# Patient Record
Sex: FEMALE | Race: OTHER | Employment: FULL TIME | RURAL
[De-identification: names, ages, dates, MRNs, and addresses within clinical notes are randomized per-mention and may not be internally consistent; named-entity substitution may affect disease eponyms.]

---

## 2017-01-04 ENCOUNTER — TELEPHONE (OUTPATIENT)
Dept: FAMILY MEDICINE CLINIC | Age: 27
End: 2017-01-04

## 2017-01-04 NOTE — TELEPHONE ENCOUNTER
Left message with patient's  to have patient call. She needs to schedule her postpartum appointment with Dr. Alla Ibanez around 1/30/17.

## 2017-01-31 ENCOUNTER — OFFICE VISIT (OUTPATIENT)
Dept: FAMILY MEDICINE CLINIC | Age: 27
End: 2017-01-31

## 2017-01-31 VITALS
TEMPERATURE: 98.2 F | RESPIRATION RATE: 16 BRPM | SYSTOLIC BLOOD PRESSURE: 97 MMHG | DIASTOLIC BLOOD PRESSURE: 70 MMHG | OXYGEN SATURATION: 97 % | BODY MASS INDEX: 30 KG/M2 | WEIGHT: 163 LBS | HEART RATE: 72 BPM | HEIGHT: 62 IN

## 2017-01-31 RX ORDER — NORGESTIMATE AND ETHINYL ESTRADIOL 0.25-0.035
1 KIT ORAL DAILY
Qty: 1 DOSE PACK | Refills: 12 | Status: SHIPPED | OUTPATIENT
Start: 2017-01-31 | End: 2017-02-28

## 2017-01-31 NOTE — PATIENT INSTRUCTIONS
After Your Delivery (the Postpartum Period): Care Instructions  Your Care Instructions  Congratulations on the birth of your baby. Like pregnancy, the  period can be a time of excitement, callie, and exhaustion. You may look at your wondrous little baby and feel happy. You may also be overwhelmed by your new sleep hours and new responsibilities. At first, babies often sleep during the days and are awake at night. They do not have a pattern or routine. They may make sudden gasps, jerk themselves awake, or look like they have crossed eyes. These are all normal, and they may even make you smile. In these first weeks after delivery, try to take good care of yourself. It may take 4 to 6 weeks to feel like yourself again, and possibly longer if you had a  birth. You will likely feel very tired for several weeks. Your days will be full of ups and downs, but lots of callie as well. Follow-up care is a key part of your treatment and safety. Be sure to make and go to all appointments, and call your doctor if you are having problems. It's also a good idea to know your test results and keep a list of the medicines you take. How can you care for yourself at home? Take care of your body after delivery  · Use pads instead of tampons for the bloody flow that may last as long as 2 weeks. · Ease cramps with ibuprofen (Advil, Motrin). · Ease soreness of hemorrhoids and the area between your vagina and rectum with ice compresses or witch hazel pads. · Ease constipation by drinking lots of fluid and eating high-fiber foods. Ask your doctor about over-the-counter stool softeners. · Cleanse yourself with a gentle squeeze of warm water from a bottle instead of wiping with toilet paper. · Take a sitz bath in warm water several times a day. · Wear a good nursing bra. Ease sore and swollen breasts with warm, wet washcloths. · If you are not breastfeeding, use ice rather than heat for breast soreness.   · Your period may not start for several months if you are breastfeeding. You may bleed more, and longer at first, than you did before you got pregnant. · Wait until you are healed (about 4 to 6 weeks) before you have sexual intercourse. Your doctor will tell you when it is okay to have sex. · Try not to travel with your baby for 5 or 6 weeks. If you take a long car trip, make frequent stops to walk around and stretch. Avoid exhaustion  · Rest every day. Try to nap when your baby naps. · Ask another adult to be with you for a few days after delivery. · Plan for  if you have other children. · Stay flexible so you can eat at odd hours and sleep when you need to. Both you and your baby are making new schedules. · Plan small trips to get out of the house. Change can make you feel less tired. · Ask for help with housework, cooking, and shopping. Remind yourself that your job is to care for your baby. Know about help for postpartum depression  · \"Baby blues\" are common for the first 1 to 2 weeks after birth. You may cry or feel sad or irritable for no reason. · Rest whenever you can. Being tired makes it harder to handle your emotions. · Go for walks with your baby. · Talk to your partner, friends, and family about your feelings. · If your symptoms last for more than a few weeks, or if you feel very depressed, ask your doctor for help. · Postpartum depression can be treated. Support groups and counseling can help. Sometimes medicine can also help. Stay healthy  · Eat healthy foods so you have more energy, make good breast milk, and lose extra baby pounds. · If you breastfeed, avoid alcohol and drugs. Stay smoke-free. If you quit during pregnancy, congratulations. · Start daily exercise after 4 to 6 weeks, but rest when you feel tired. · Learn exercises to tone your belly. Do Kegel exercises to regain strength in your pelvic muscles. You can do these exercises while you stand or sit.   ¨ Squeeze the same muscles you would use to stop your urine. Your belly and thighs should not move. ¨ Hold the squeeze for 3 seconds, and then relax for 3 seconds. ¨ Start with 3 seconds. Then add 1 second each week until you are able to squeeze for 10 seconds. ¨ Repeat the exercise 10 to 15 times for each session. Do three or more sessions each day. · Find a class for new mothers and new babies that has an exercise time. · If you had a  birth, give yourself a bit more time before you exercise, and be careful. When should you call for help? Call 911 anytime you think you may need emergency care. For example, call if:  · You passed out (lost consciousness). Call your doctor now or seek immediate medical care if:  · You have severe vaginal bleeding. This means you are passing blood clots and soaking through a pad each hour for 2 or more hours. · You are dizzy or lightheaded, or you feel like you may faint. · You have a fever. · You have new belly pain, or your pain gets worse. Watch closely for changes in your health, and be sure to contact your doctor if:  · Your vaginal bleeding seems to be getting heavier. · You have new or worse vaginal discharge. · You feel sad, anxious, or hopeless for more than a few days. · You do not get better as expected. Where can you learn more? Go to http://maciej-demarco.info/. Enter A461 in the search box to learn more about \"After Your Delivery (the Postpartum Period): Care Instructions. \"  Current as of: May 30, 2016  Content Version: 11.1  © 6930-0404 [employer]. Care instructions adapted under license by [employer] (which disclaims liability or warranty for this information). If you have questions about a medical condition or this instruction, always ask your healthcare professional. [address] any warranty or liability for your use of this information.

## 2017-01-31 NOTE — PROGRESS NOTES
Reviewed record in preparation for visit and have obtained necessary documentation. Patient did not bring medications to visit for review. Information provided on Advanced Directive, Living Will.   Health Maintenance Due   Topic Date Due    HPV AGE 9Y-34Y (1 of 3 - Female 3 Dose Series) 09/08/2001

## 2017-01-31 NOTE — PROGRESS NOTES
Prabha Montalvo is an 32 y.o. female who presents with chief concern of post partum 6 week. She has recently gone back to work and baby is staying with his grandmother. Ms Andrey Downing is doing very well and enjoying baby. She is concerned with 15# of weight above pre-pregnancy weight. Baby: Doing well, baby is now having both formula and breast.  She is breast feeding at night and formula during the day. Grand-mother is watching during the day. Breast: No inflammation or pain. She is not pumping at this time. Birth control: She previously was on OCP, and is interested in resuming. Bleeding: Light period which is almost stopped. Blues: Her mood is great, she has no thoughts of hurting self or baby. She had depression with her previous pregnancy, but none this time. Detailed ROS below. Review of Systems, positives are bolded, strike through if denied. GEN:    CV:      Pulm:    Abd/GI:   Psych:    Neuro:     ENT:      Endocrine:     :      MSK:      Skin:    Lower Ext:           Current and past medical information:  I personally reviewed and included updated list below. Past Medical History   Diagnosis Date    Anemia 7/30/2012    Depression 4/19/2013       No Known Allergies    History reviewed. No pertinent past surgical history.     Social History     Social History    Marital status:      Spouse name: N/A    Number of children: N/A    Years of education: N/A     Social History Main Topics    Smoking status: Never Smoker    Smokeless tobacco: Never Used    Alcohol use No    Drug use: No    Sexual activity: Yes     Partners: Male     Birth control/ protection: None     Other Topics Concern    None     Social History Narrative           Physical Exam, Abnormal/pertinent findings bolded,     Visit Vitals    BP 97/70 (BP 1 Location: Left arm, BP Patient Position: Sitting)    Pulse 72    Temp 98.2 °F (36.8 °C) (Oral)    Resp 16    Ht 5' 2\" (1.575 m)    Wt 163 lb (73.9 kg)    SpO2 97%    BMI 29.81 kg/m2          GEN:    Alert and Oriented, No acute distress  Psych:  Mood appropriate, No pressured speech, linear thoughts  CV:    Regular Rhythm, S1 and S2 audible, no MRG, no palpable thrills  Pulm:    CTA B/L, no wheezes/rubs  GI/Abd:   Non-tender to palpation, normal bowel sounds x4, no masses  Neuro:   Lucid, No focal deficits  ENT:    EOMI, Non-icteric sclera, MMM  Neck:   Trachea midline, no lymphadenopathy  :    No suprapubic tenderness  MSK:  Normal gait, FROM in all four extremities  Skin:   No visible Rash, Ecchymosis, or Excoriations  Lower Ext: No edema, No tenderness to palpation, No palpable cords  GYN:  Uterus not enlarged, no adnexal masses, cervix closed. Assessment/PLAN    1. Postpartum care and examination  - She is doing great post-partum. Plan to resume her OCP's. Discussed normal lochia and likely resolution soon. Discussed safety of baby and signs of depression and ways to cope if occurs. - norgestimate-ethinyl estradiol (Meredith Cambria) 0.25-35 mg-mcg tab; Take 1 Tab by mouth daily for 28 days. Indications: ABNORMAL UTERINE BLEEDING  Dispense: 1 Dose Pack; Refill: 12    Follow-up Disposition: Not on File  For next visit follow up safe weight loss. Plan of care:  Discussed diagnoses in detail with patient. Medication risks/benefits/side effects discussed with patient. All of the patient's questions were addressed. The patient understands and agrees with our plan of care. The patient knows to call back if they are unsure of or forget any changes we discussed today or if the symptoms change. The patient received an After-Visit Summary which contains VS, orders, medication list and allergy list. This can be used as a \"mini-medical record\" should they have to seek medical care while out of town.       Bo Aguilera DO  Resident note, PGY-3  Patient discussed with Precept Physician, Preston Hare MD    No future appointments. Current Medications after this visit[de-identified]       Current Outpatient Prescriptions   Medication Sig    norgestimate-ethinyl estradiol (ORTHO-CYCLEN, SPRINTEC) 0.25-35 mg-mcg tab Take 1 Tab by mouth daily for 28 days. Indications: ABNORMAL UTERINE BLEEDING     No current facility-administered medications for this visit.

## 2017-01-31 NOTE — MR AVS SNAPSHOT
Visit Information Cyn Hoff Personal Médico Departamento Teléfono del Dep. Número de visita 1/31/2017  3:30  [facility] S [phone] Upcoming Health Maintenance Date Due  
 HPV AGE 9Y-34Y (1 of 3 - Female 3 Dose Series) 9/8/2001 PAP AKA CERVICAL CYTOLOGY 5/17/2019 DTaP/Tdap/Td series (2 - Td) 10/31/2022 [place]  Review Complete El: 1/31/2017 Por: Dennora Maldonado LPN A partir del:  1/31/2017 No Known Allergies Vacunas actuales BCXTUCKNA el:  11/10/2016 Joy Duel TDAP Vaccine 10/31/2012 12:16 PM  
  
 No revisadas esta visita You Were Diagnosed With   
  
 Monisha Pu Postpartum care and examination    -  Primary ICD-10-CM: Z39.2 ICD-9-CM: V24.2 Partes vitales PS Pulso Temperatura Resp [place] ( percentil de crecimiento) Peso (percentil de crecimiento) 97/70 (BP 1 Location: Left arm, BP Patient Position: Sitting) 72 98.2 °F (36.8 °C) (Oral) 16 5' 2\" (1.575 m) 163 lb (73.9 kg) SpO2 BMI (C) Estado obstétrico Estatus de tabaquísmo 97% 29.81 kg/m2 Recent pregnancy Never Smoker Historial de signos vitales BMI and BSA Data Body Mass Index Body Surface Area  
 29.81 kg/m 2 1.8 m 2 Efrem Mcdaniels Pharmacy Name Phone [facility] [address] [place] [address] [phone] Tyler lista de medicamentos actualizada Lista actualizada el: 1/31/17  4:11 PM.  Gino Gallego use tyler lista de medicamentos más reciente. ferrous sulfate 325 mg (65 mg iron) tablet Take 1 Tab by mouth Daily (before breakfast). Indications: IRON DEFICIENCY ANEMIA  
  
 ibuprofen 800 mg tablet También conocido luis:  MOTRIN Take 1 Tab by mouth every eight (8) hours as needed for Pain.  
  
 norgestimate-ethinyl estradiol 0.25-35 mg-mcg Tab También conocido luis:  Herlinda Cook Take 1 Tab by mouth daily for 28 days. Indications: ABNORMAL UTERINE BLEEDING  
  
 prenatal multivit-ca-min-fe-fa Tab También conocido luis:  PRENATAL VITAMIN Take 1 Tab by mouth daily. Recetas Hortenciado ayaz Walton Refills  
 norgestimate-ethinyl estradiol (ORTHO-CYCLEN, SPRINTEC) 0.25-35 mg-mcg tab 12 Sig: Take 1 Tab by mouth daily for 28 days. Indications: ABNORMAL UTERINE BLEEDING Class: Normal  
 Pharmacy: [address] #: [phone] Route: Oral  
  
Instrucciones para el Paciente After Your Delivery (the Postpartum Period): Care Instructions Your Care Instructions Congratulations on the birth of your baby. Like pregnancy, the  period can be a time of excitement, callie, and exhaustion. You may look at your wondrous little baby and feel happy. You may also be overwhelmed by your new sleep hours and new responsibilities. At first, babies often sleep during the days and are awake at night. They do not have a pattern or routine. They may make sudden gasps, jerk themselves awake, or look like they have crossed eyes. These are all normal, and they may even make you smile. In these first weeks after delivery, try to take good care of yourself. It may take 4 to 6 weeks to feel like yourself again, and possibly longer if you had a  birth. You will likely feel very tired for several weeks. Your days will be full of ups and downs, but lots of callie as well. Follow-up care is a key part of your treatment and safety. Be sure to make and go to all appointments, and call your doctor if you are having problems. It's also a good idea to know your test results and keep a list of the medicines you take. How can you care for yourself at home? Take care of your body after delivery · Use pads instead of tampons for the bloody flow that may last as long as 2 weeks. · Ease cramps with ibuprofen (Advil, Motrin). · Ease soreness of hemorrhoids and the area between your vagina and rectum with ice compresses or witch hazel pads. · Ease constipation by drinking lots of fluid and eating high-fiber foods. Ask your doctor about over-the-counter stool softeners. · Cleanse yourself with a gentle squeeze of warm water from a bottle instead of wiping with toilet paper. · Take a sitz bath in warm water several times a day. · Wear a good nursing bra. Ease sore and swollen breasts with warm, wet washcloths. · If you are not breastfeeding, use ice rather than heat for breast soreness. · Your period may not start for several months if you are breastfeeding. You may bleed more, and longer at first, than you did before you got pregnant. · Wait until you are healed (about 4 to 6 weeks) before you have sexual intercourse. Your doctor will tell you when it is okay to have sex. · Try not to travel with your baby for 5 or 6 weeks. If you take a long car trip, make frequent stops to walk around and stretch. Avoid exhaustion · Rest every day. Try to nap when your baby naps. · Ask another adult to be with you for a few days after delivery. · Plan for  if you have other children. · Stay flexible so you can eat at odd hours and sleep when you need to. Both you and your baby are making new schedules. · Plan small trips to get out of the house. Change can make you feel less tired. · Ask for help with housework, cooking, and shopping. Remind yourself that your job is to care for your baby. Know about help for postpartum depression · \"Baby blues\" are common for the first 1 to 2 weeks after birth. You may cry or feel sad or irritable for no reason. · Rest whenever you can. Being tired makes it harder to handle your emotions. · Go for walks with your baby. · Talk to your partner, friends, and family about your feelings. · If your symptoms last for more than a few weeks, or if you feel very depressed, ask your doctor for help. · Postpartum depression can be treated. Support groups and counseling can help. Sometimes medicine can also help. Stay healthy · Eat healthy foods so you have more energy, make good breast milk, and lose extra baby pounds. · If you breastfeed, avoid alcohol and drugs. Stay smoke-free. If you quit during pregnancy, congratulations. · Start daily exercise after 4 to 6 weeks, but rest when you feel tired. · Learn exercises to tone your belly. Do Kegel exercises to regain strength in your pelvic muscles. You can do these exercises while you stand or sit. ¨ Squeeze the same muscles you would use to stop your urine. Your belly and thighs should not move. ¨ Hold the squeeze for 3 seconds, and then relax for 3 seconds. ¨ Start with 3 seconds. Then add 1 second each week until you are able to squeeze for 10 seconds. ¨ Repeat the exercise 10 to 15 times for each session. Do three or more sessions each day. · Find a class for new mothers and new babies that has an exercise time. · If you had a  birth, give yourself a bit more time before you exercise, and be careful. When should you call for help? Call 911 anytime you think you may need emergency care. For example, call if: 
· You passed out (lost consciousness). Call your doctor now or seek immediate medical care if: 
· You have severe vaginal bleeding. This means you are passing blood clots and soaking through a pad each hour for 2 or more hours. · You are dizzy or lightheaded, or you feel like you may faint. · You have a fever. · You have new belly pain, or your pain gets worse. Watch closely for changes in your health, and be sure to contact your doctor if: 
· Your vaginal bleeding seems to be getting heavier. · You have new or worse vaginal discharge. · You feel sad, anxious, or hopeless for more than a few days. · You do not get better as expected. Where can you learn more? Go to http://adarsh.info/. Enter A461 in the search box to learn more about \"After Your Delivery (the Postpartum Period): Care Instructions. \" Current as of: May 30, 2016 Content Version: 11.1 © 9760-3777 [employer]. Care instructions adapted under license by [employer] (which disclaims liability or warranty for this information). If you have questions about a medical condition or this instruction, always ask your healthcare professional. [address] any warranty or liability for your use of this information. Por favor proporcione lawanda resumen de la documentación de cuidado a deleon próximo proveedor. Your primary care clinician is listed as Brian Courtney. If you have any questions after today's visit, please call [phone].

## 2019-01-04 ENCOUNTER — OFFICE VISIT (OUTPATIENT)
Dept: FAMILY MEDICINE CLINIC | Age: 29
End: 2019-01-04

## 2019-01-04 VITALS
DIASTOLIC BLOOD PRESSURE: 66 MMHG | OXYGEN SATURATION: 99 % | BODY MASS INDEX: 31.1 KG/M2 | SYSTOLIC BLOOD PRESSURE: 113 MMHG | HEART RATE: 73 BPM | TEMPERATURE: 97.9 F | HEIGHT: 62 IN | WEIGHT: 169 LBS | RESPIRATION RATE: 16 BRPM

## 2019-01-04 DIAGNOSIS — R20.2 NUMBNESS AND TINGLING OF LEFT SIDE OF FACE: ICD-10-CM

## 2019-01-04 DIAGNOSIS — R20.2 NUMBNESS AND TINGLING IN LEFT ARM: Primary | ICD-10-CM

## 2019-01-04 DIAGNOSIS — R20.2 NUMBNESS AND TINGLING OF LEFT LEG: ICD-10-CM

## 2019-01-04 DIAGNOSIS — R20.0 NUMBNESS AND TINGLING OF LEFT SIDE OF FACE: ICD-10-CM

## 2019-01-04 DIAGNOSIS — R20.0 NUMBNESS AND TINGLING IN LEFT ARM: Primary | ICD-10-CM

## 2019-01-04 DIAGNOSIS — R20.0 NUMBNESS AND TINGLING OF LEFT LEG: ICD-10-CM

## 2019-01-04 DIAGNOSIS — F41.9 ANXIETY: ICD-10-CM

## 2019-01-04 DIAGNOSIS — R63.5 WEIGHT GAIN: ICD-10-CM

## 2019-01-04 NOTE — PROGRESS NOTES
1. Have you been to the ER, urgent care clinic since your last visit? Hospitalized since your last visit? no 
 
2. Have you seen or consulted any other health care providers outside of the [address] [place] [address] since your last visit? Include any pap smears or colon screening. no 
Reviewed record in preparation for visit and have obtained necessary documentation. Patient did not bring medications to visit for review. Information provided on Advanced Directive, Living Will. Body mass index is 30.91 kg/m². Health Maintenance Due Topic Date Due  Influenza Age 5 to Adult  08/01/2018

## 2019-01-04 NOTE — PROGRESS NOTES
CC: Numbness HPI: Pt is a 29 y.o. female who presents for numbness. She states that for the past 5 weeks she has experienced numbness in her L arm on and off throughout the day. She has also had numbness in her L cheek that comes and goes and in her upper left leg. Symptoms in the L arm start at the R shoulder and move down to all fingers in that hand. No associated neck pain or stiffness. No history of similar symptoms and no FH of similar symptoms or neurologic conditions. Also associated with L eyelid spasm on and off. Denies fevers and weight loss and she reports significant weight gain in the past 2 years (per our charts her last visit 2 years ago was on par with her weight today, however she was 6 weeks postpartum at that time). She reports constant hunger and a feeling of anxiety. Past Medical History:  
Diagnosis Date  Anemia 7/30/2012  Depression 4/19/2013 Family History Problem Relation Age of Onset  Diabetes Mother Social History Tobacco Use  Smoking status: Never Smoker  Smokeless tobacco: Never Used Substance Use Topics  Alcohol use: No  
 Drug use: No  
 
 
ROS: 
Positive only when bolded Constitutional: HA, F/C, changes in weight Eyes: Changes in vision Respiratory: SOB, wheezing, cough Cardiovascular: CP, palpitations Musculoskeletal: Myalgias, arthralgias Neurological: Changes in gait, numbness/tingling PE: 
Visit Vitals /66 (BP 1 Location: Right arm, BP Patient Position: Sitting) Pulse 73 Temp 97.9 °F (36.6 °C) (Oral) Resp 16 Ht 5' 2\" (1.575 m) Wt 169 lb (76.7 kg) SpO2 99% BMI 30.91 kg/m² Gen: Pt sitting in chair, in NAD, subconsciously clenching and unclenching left fist and shaking left arm while talking Head: Normocephalic, atraumatic Eyes: Sclera anicteric, EOM intact, PERRL Throat: MMM, normal lips, tongue, teeth and gums Neck: Supple CVS: Normal S1, S2, no m/r/g Resp: CTAB, no wheezes or rales Extrem: Atraumatic, no cyanosis or edema Pulses: 2+ Skin: Warm, dry Neuro: Alert, oriented, appropriate. CN III-XII intact except reports decreased sensation on R forehead and L jaw when compared to the other side. Decreased sensation with tingling sensation from L shoulder down to L fingers.  strength 5/5 and equal. Sensation intact to light touch in BLE and strength 5/5. A/P: Pt is a 29 y.o. female who presents for new onset episodes of numbness, concerning for neurologic process particularly MS. Discussed with pt, would like to get MRI brain for further evaluation and Neurologic referral. 
- Referral to Neuro - MRI brain 
- TSH 
- CBC 
- Vitamin B12 
- Folate 
- RTC prn pending results of above studies Discussed diagnoses in detail with patient. Medication risks/benefits/side effects discussed with patient. All of the patient's questions were addressed. The patient understands and agrees with our plan of care. The patient knows to call back if they are unsure of or forget any changes we discussed today or if the symptoms change. The patient received an After-Visit Summary which contains VS, orders, medication list and allergy list. This can be used as a \"mini-medical record\" should they have to seek medical care while out of town. No current outpatient medications on file prior to visit. No current facility-administered medications on file prior to visit.

## 2019-01-04 NOTE — PATIENT INSTRUCTIONS
Entumecimiento y hormigueo: Instrucciones de cuidado - [ Numbness and Tingling: Care Instructions ] Instrucciones de cuidado Muchas cosas pueden causar entumecimiento u hormigueo. Ish hinchazón puede ejercer presión [employer]. [place] podría causarle pérdida de sensación o que tenga ish sensación de hormigueo en parte de deleon cuerpo. Los nervios pueden ser dañados por traumatismos, toxinas o enfermedades luis diabetes o esclerosis múltiple (MS, por jayme siglas en [facility]). Sin embargo, algunas veces la causa no está geronimo. Si no hay razón aparente para los síntomas, y no tiene ningún otro síntoma, deleon médico podría sugerir vigilar y esperar pennie un tiempo para clyde si el entumecimiento o el hormigueo desaparecen por sí solos. Es posible que deleon médico quiera que usted se larissa análisis de you o pruebas de los nervios para encontrar la causa de jayme síntomas. La atención de seguimiento es ish parte clave de deleon tratamiento y seguridad. Asegúrese de hacer y acudir a todas las citas, y llame a deleon médico si está teniendo problemas. También es ish buena idea saber los resultados de jayme exámenes y mantener ish lista de los medicamentos que toni. Cómo puede cuidarse en el hogar? · Si deleon médico le receta medicamentos, tómelos exactamente según las indicaciones. Llame a deleon médico si greg estar teniendo un problema con jayme medicamentos. · Si tiene alguna hinchazón, colóquese hielo o ish compresa fría en la anupama de 10 a 20 minutos por vez. Póngase un paño gayle entre el hielo y la piel. Cuándo debe pedir ayuda? Llame al 911 en cualquier momento que considere que necesita atención de [place]. Por ejemplo, llame si: 
  · Tiene debilidad, entumecimiento u hormigueo en ambas piernas.  
  · Pierde el control de los intestinos o de la vejiga.  
  · Tiene síntomas de un ataque cerebral. Estos pueden incluir: ? Entumecimiento, hormigueo, debilidad o parálisis repentinos en la chava, el brazo o la pierna, sobre todo si ocurre en un solo lado del cuerpo. ? Cambios súbitos en la vista. ? Problemas repentinos para hablar. ? Confusión súbita o dificultad repentina para comprender frases sencillas. ? Problemas repentinos para caminar o mantener el equilibrio. ? Un dolor de ehsan intenso y repentino, distinto a los nydia de ehsan anteriores.  
 Preste especial atención a los cambios en deleon ekta y asegúrese de comunicarse con deleon médico si tiene cualquier problema, o si: 
  · No mejora luis se esperaba. Dónde puede encontrar más información en inglés? Ramon Mena a http://maciej-demarco.info/. Zefelipe Spotted B182 en la búsqueda para aprender más acerca de \"Entumecimiento y hormigueo: Instrucciones de cuidado - [ Numbness and Tingling: Care Instructions ]. \" 
Revisado: 4 junio, 2018 Versión del contenido: 11.8 © 8012-2774 [employer]wise, Incorporated. Las instrucciones de cuidado fueron adaptadas bajo licencia por [employer] (which disclaims liability or warranty for this information). Si usted tiene [place] afección médica o sobre estas instrucciones, siempre pregunte a deleon profesional de ekta. Healthwise, Incorporated niega toda garantía o responsabilidad por deleon uso de esta información. Numbness and Tingling: Care Instructions Your Care Instructions Many things can cause numbness or tingling. Swelling may put pressure on a nerve. This could cause you to lose feeling or have a pins-and-needles sensation on part of your body. Nerves may be damaged from trauma, toxins, or diseases, such as diabetes or multiple sclerosis (MS). Sometimes, though, the cause is not clear. If there is no clear reason for your symptoms, and you are not having any other symptoms, your doctor may suggest watching and waiting for a while to see if the numbness or tingling goes away on its own.  Your doctor may want you to have blood or nerve tests to find the cause of your symptoms. Follow-up care is a key part of your treatment and safety. Be sure to make and go to all appointments, and call your doctor if you are having problems. It's also a good idea to know your test results and keep a list of the medicines you take. How can you care for yourself at home? · If your doctor prescribes medicine, take it exactly as directed. Call your doctor if you think you are having a problem with your medicine. · If you have any swelling, put ice or a cold pack on the area for 10 to 20 minutes at a time. Put a thin cloth between the ice and your skin. When should you call for help? Call 911 anytime you think you may need emergency care. For example, call if: 
  · You have weakness, numbness, or tingling in both legs.  
  · You lose bowel or bladder control.  
  · You have symptoms of a stroke. These may include: 
? Sudden numbness, tingling, weakness, or loss of movement in your face, arm, or leg, especially on only one side of your body. ? Sudden vision changes. ? Sudden trouble speaking. ? Sudden confusion or trouble understanding simple statements. ? Sudden problems with walking or balance. ? A sudden, severe headache that is different from past headaches.  
 Watch closely for changes in your health, and be sure to contact your doctor if you have any problems, or if: 
  · You do not get better as expected. Where can you learn more? Go to http://maciej-demarco.info/. Enter A854 in the search box to learn more about \"Numbness and Tingling: Care Instructions. \" Current as of: September 10, 2017 Content Version: 11.8 © 9743-6834 [employer]. Care instructions adapted under license by [employer] (which disclaims liability or warranty for this information).  If you have questions about a medical condition or this instruction, always ask your healthcare professional. [employer] disclaims any warranty or liability for your use of this information.

## 2019-01-05 LAB
BASOPHILS # BLD AUTO: 0 X10E3/UL (ref 0–0.2)
BASOPHILS NFR BLD AUTO: 0 %
EOSINOPHIL # BLD AUTO: 0.2 X10E3/UL (ref 0–0.4)
EOSINOPHIL NFR BLD AUTO: 2 %
ERYTHROCYTE [DISTWIDTH] IN BLOOD BY AUTOMATED COUNT: 14.3 % (ref 12.3–15.4)
FOLATE SERPL-MCNC: 6.5 NG/ML
HCT VFR BLD AUTO: 37.5 % (ref 34–46.6)
HGB BLD-MCNC: 12 G/DL (ref 11.1–15.9)
IMM GRANULOCYTES # BLD AUTO: 0 X10E3/UL (ref 0–0.1)
IMM GRANULOCYTES NFR BLD AUTO: 0 %
LYMPHOCYTES # BLD AUTO: 2.1 X10E3/UL (ref 0.7–3.1)
LYMPHOCYTES NFR BLD AUTO: 22 %
MCH RBC QN AUTO: 27.9 PG (ref 26.6–33)
MCHC RBC AUTO-ENTMCNC: 32 G/DL (ref 31.5–35.7)
MCV RBC AUTO: 87 FL (ref 79–97)
MONOCYTES # BLD AUTO: 0.5 X10E3/UL (ref 0.1–0.9)
MONOCYTES NFR BLD AUTO: 5 %
NEUTROPHILS # BLD AUTO: 6.6 X10E3/UL (ref 1.4–7)
NEUTROPHILS NFR BLD AUTO: 71 %
PLATELET # BLD AUTO: 283 X10E3/UL (ref 150–379)
RBC # BLD AUTO: 4.3 X10E6/UL (ref 3.77–5.28)
TSH SERPL DL<=0.005 MIU/L-ACNC: 1.93 UIU/ML (ref 0.45–4.5)
VIT B12 SERPL-MCNC: 619 PG/ML (ref 232–1245)
WBC # BLD AUTO: 9.6 X10E3/UL (ref 3.4–10.8)

## 2019-01-08 ENCOUNTER — TELEPHONE (OUTPATIENT)
Dept: FAMILY MEDICINE CLINIC | Age: 29
End: 2019-01-08

## 2019-01-08 NOTE — TELEPHONE ENCOUNTER
Called to inform pt of recent labs. CBC, TSH, folate and B12 normal. We are still working on her Neuro referral. They called her about the MRI and her  took a message but he wrote the number down wrong. Provided pt with number [phone]. All questions answered and pt feels comfortable with the plan of care.

## 2019-02-26 ENCOUNTER — OFFICE VISIT (OUTPATIENT)
Dept: NEUROLOGY | Age: 29
End: 2019-02-26

## 2019-02-26 VITALS
RESPIRATION RATE: 16 BRPM | SYSTOLIC BLOOD PRESSURE: 106 MMHG | DIASTOLIC BLOOD PRESSURE: 68 MMHG | OXYGEN SATURATION: 97 % | BODY MASS INDEX: 30.18 KG/M2 | WEIGHT: 164 LBS | HEART RATE: 66 BPM | HEIGHT: 62 IN

## 2019-02-26 DIAGNOSIS — R20.2 PARESTHESIA: Primary | ICD-10-CM

## 2019-02-26 NOTE — PROGRESS NOTES
Chief Complaint   Patient presents with    Other     left arm going numb/ happens alot/ sometimes it is the right, but mostly the Left    Other     left facial numbness at times     Visit Vitals  /68   Pulse 66   Resp 16   Ht 5' 2\" (1.575 m)   Wt 74.4 kg (164 lb)   SpO2 97%   BMI 30.00 kg/m²

## 2019-02-26 NOTE — PROGRESS NOTES
NEUROLOGY NEW PATIENT OFFICE CONSULTATION      2/26/2019    RE: Mumtaz Henao         1990      REFERRED BY:  Phan Wiggins MD        CHIEF COMPLAINT:  This is Mumtaz Henao is a 29 y.o. female right handed works in [employer] who had concerns including Other (left arm going numb/ happens alot/ sometimes it is the right, but mostly the Left) and Other (left facial numbness at times). HPI:     2 months ago, patient noted sudden numbness described as tingling of the whole L UE, then eventually the whole L leg. Also had episodes of L cheek numbness on and off but only happened 3 times and lasting for few secs. 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       Social Hx  Social History     Socioeconomic History    Marital status:      Spouse name: Not on file    Number of children: Not on file    Years of education: Not on file    Highest education level: Not on file   Tobacco Use    Smoking status: Never Smoker    Smokeless tobacco: Never Used   Substance and Sexual Activity    Alcohol use: No    Drug use: No    Sexual activity: Yes     Partners: Male     Birth control/protection: None       Family Hx  Family History   Problem Relation Age of Onset    Diabetes Mother        ALLERGIES  No Known Allergies    CURRENT MEDS          PREVIOUS WORKUP: (reviewed)  IMAGING:    CT Results (recent):  No results found for this or any previous visit. MRI Results (recent):  No results found for this or any previous visit. IR Results (recent):  No results found for this or any previous visit. VAS/US Results (recent):  No results found for this or any previous visit.         LABS (reviewed)  Results for orders placed or performed in visit on 01/04/19 TSH 3RD GENERATION   Result Value Ref Range    TSH 1.930 0.450 - 4.500 uIU/mL   CBC WITH AUTOMATED DIFF   Result Value Ref Range    WBC 9.6 3.4 - 10.8 x10E3/uL    RBC 4.30 3.77 - 5.28 x10E6/uL    HGB 12.0 11.1 - 15.9 g/dL    HCT 37.5 34.0 - 46.6 %    MCV 87 79 - 97 fL    MCH 27.9 26.6 - 33.0 pg    MCHC 32.0 31.5 - 35.7 g/dL    RDW 14.3 12.3 - 15.4 %    PLATELET 834 279 - 900 x10E3/uL    NEUTROPHILS 71 Not Estab. %    Lymphocytes 22 Not Estab. %    MONOCYTES 5 Not Estab. %    EOSINOPHILS 2 Not Estab. %    BASOPHILS 0 Not Estab. %    ABS. NEUTROPHILS 6.6 1.4 - 7.0 x10E3/uL    Abs Lymphocytes 2.1 0.7 - 3.1 x10E3/uL    ABS. MONOCYTES 0.5 0.1 - 0.9 x10E3/uL    ABS. EOSINOPHILS 0.2 0.0 - 0.4 x10E3/uL    ABS. BASOPHILS 0.0 0.0 - 0.2 x10E3/uL    IMMATURE GRANULOCYTES 0 Not Estab. %    ABS. IMM. GRANS. 0.0 0.0 - 0.1 x10E3/uL   VITAMIN B12   Result Value Ref Range    Vitamin B12 619 232 - 1,245 pg/mL   FOLATE   Result Value Ref Range    Folate 6.5 >3.0 ng/mL       Physical Exam:     Visit Vitals  /68   Pulse 66   Resp 16   Ht 5' 2\" (1.575 m)   Wt 74.4 kg (164 lb)   SpO2 97%   BMI 30.00 kg/m²     General:  Alert, cooperative, no distress. Head:  Normocephalic, without obvious abnormality, atraumatic. Eyes:  Conjunctivae/corneas clear. Lungs:  Heart:   Non labored breathing  Regular rate and rhythm, no carotid bruits   Abdomen:   Soft, non-distended   Extremities: Extremities normal, atraumatic, no cyanosis or edema. Pulses: 2+ and symmetric all extremities. Skin: Skin color, texture, turgor normal. No rashes or lesions.   Neurologic Exam     Gen: Attention normal             Language: naming, repetition, fluency normal             Memory: intact recent and remote memory  Cranial Nerves:  I: smell Not tested   II: visual fields Full to confrontation   II: pupils Equal, round, reactive to light   II: optic disc No papilledema   III,VII: ptosis none   III,IV,VI: extraocular muscles  Full ROM   V: mastication normal   V: facial light touch sensation  normal   VII: facial muscle function   symmetric   VIII: hearing symmetric   IX: soft palate elevation  normal   XI: trapezius strength  5/5   XI: sternocleidomastoid strength 5/5   XI: neck flexion strength  5/5   XII: tongue  midline     Motor: normal bulk and tone, no tremor              Strength: 5/5 all four extremities  Decrease L finger and L foot tapping  Sensory: dec PP whole L face, L hand and L foot  Reflexes: 2+ throughout; Down going toes  Coordination: Good FTN and HTS  Gait: Favors L foot; able to stand on toes and heels           Impression:     Ellie Muhammad is a 29 y.o. 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 Also had episodes of L cheek numbness on and off but only happened 3 times and lasting for few secs. Exam also reveals clumsiness of the L hand and L foot. Considerations include multiple sclerosis. RECOMMENDATIONS  1. Will do MRI Brain with JO-[facility] to look for demyelinating disease  2. Will consider doing MRI cervical spine with JO-ANN if above is unremarkable  3. Depending on above, will consider Solu-medrol and prednisone taper  4. Reviewed medical records and blood work in [facility]    Follow-up Disposition:  Return for review of results.       Thank you for the consultation      Myke Cruz MD  Diplomate, American Board of Psychiatry and Neurology  Diplomate, Neuromuscular Medicine  Diplomate, American Board of Electrodiagnostic Medicine        CC: Arvind Lin MD  Fax: [phone]

## 2019-02-26 NOTE — LETTER
2/26/2019 11:44 AM 
 
Patient:  Ritesh Cea YOB: 1990 Date of Visit: 2/26/2019 Dear Regine Martinez MD 
[address] [place] [address] VIA In Basket Yared Sharma MD 
[address] [address] VIA In Basket 
 : Thank you for referring Ms. Frankie Seals to me for evaluation/treatment. Below are the relevant portions of my assessment and plan of care. If you have questions, please do not hesitate to call me. I look forward to following Ms. Florian Corado along with you. Sincerely, Lucia Meredith MD

## 2020-01-31 ENCOUNTER — TELEPHONE (OUTPATIENT)
Dept: FAMILY MEDICINE CLINIC | Age: 30
End: 2020-01-31

## 2020-01-31 RX ORDER — OSELTAMIVIR PHOSPHATE 75 MG/1
75 CAPSULE ORAL 2 TIMES DAILY
Qty: 10 CAP | Refills: 0 | Status: SHIPPED | OUTPATIENT
Start: 2020-01-31 | End: 2020-02-05

## 2020-01-31 NOTE — TELEPHONE ENCOUNTER
Pt came in office today w/ family member who tested positive for flu.  Pt requesting tamiflu for preventive measures

## 2020-03-21 ENCOUNTER — HOSPITAL ENCOUNTER (EMERGENCY)
Age: 30
Discharge: HOME | End: 2020-03-21
Attending: EMERGENCY MEDICINE | Admitting: EMERGENCY MEDICINE
Payer: SELF-PAY

## 2020-03-21 VITALS
DIASTOLIC BLOOD PRESSURE: 65 MMHG | TEMPERATURE: 98.3 F | BODY MASS INDEX: 29.06 KG/M2 | SYSTOLIC BLOOD PRESSURE: 108 MMHG | HEIGHT: 63 IN | WEIGHT: 164 LBS | HEART RATE: 80 BPM | OXYGEN SATURATION: 100 %

## 2020-03-21 DIAGNOSIS — R11.2 NAUSEA AND VOMITING, INTRACTABILITY OF VOMITING NOT SPECIFIED, UNSPECIFIED VOMITING TYPE: ICD-10-CM

## 2020-03-21 DIAGNOSIS — R51.9 NONINTRACTABLE HEADACHE, UNSPECIFIED CHRONICITY PATTERN, UNSPECIFIED HEADACHE TYPE: ICD-10-CM

## 2020-03-21 DIAGNOSIS — Z34.91 PREGNANT AND NOT YET DELIVERED IN FIRST TRIMESTER: Primary | ICD-10-CM

## 2020-03-21 LAB — DEPRECATED S PYO AG THROAT QL EIA: NEGATIVE

## 2020-03-21 PROCEDURE — 99282 EMERGENCY DEPT VISIT SF MDM: CPT

## 2020-03-21 PROCEDURE — 87880 STREP A ASSAY W/OPTIC: CPT

## 2020-03-21 PROCEDURE — 87070 CULTURE OTHR SPECIMN AEROBIC: CPT

## 2020-03-21 PROCEDURE — 87147 CULTURE TYPE IMMUNOLOGIC: CPT

## 2020-03-21 RX ORDER — PROMETHAZINE HYDROCHLORIDE 25 MG/1
25 TABLET ORAL
Qty: 12 TAB | Refills: 0 | Status: SHIPPED | OUTPATIENT
Start: 2020-03-21 | End: 2020-03-28

## 2020-03-21 NOTE — ED PROVIDER NOTES
HPI the patient is approximately 7 weeks pregnant. She complains of a 2-week history of intermittent occipital headache she has also had occasional episodes of nausea/vomiting. She has not taken any medication for the headache. She also complains of sore throat for the past few days. She denies fever, cough, vaginal bleeding, abdominal pain, urinary symptoms, or diarrhea. 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       Family History:   Problem Relation Age of Onset    Diabetes Mother        Social History     Socioeconomic History    Marital status:      Spouse name: Not on file    Number of children: Not on file    Years of education: Not on file    Highest education level: Not on file   Occupational History    Not on file   Social Needs    Financial resource strain: Not on file    Food insecurity     Worry: Not on file     Inability: Not on file    Transportation needs     Medical: Not on file     Non-medical: Not on file   Tobacco Use    Smoking status: Never Smoker    Smokeless tobacco: Never Used   Substance and Sexual Activity    Alcohol use: No    Drug use: No    Sexual activity: Yes     Partners: Male     Birth control/protection: None   Lifestyle    Physical activity     Days per week: Not on file     Minutes per session: Not on file    Stress: Not on file   Relationships    Social connections     Talks on phone: Not on file     Gets together: Not on file     Attends Yazidism service: Not on file     Active member of club or organization: Not on file     Attends meetings of clubs or organizations: Not on file     Relationship status: Not on file    Intimate partner violence     Fear of current or ex partner: Not on file     Emotionally abused: Not on file     Physically abused: Not on file     Forced sexual activity: Not on file   Other Topics Concern    Not on file   Social History Narrative    Not on file         ALLERGIES: Patient has no known allergies. Review of Systems   Constitutional: Negative for fever. HENT: Positive for sore throat. Eyes: Negative for visual disturbance. Respiratory: Negative for cough, shortness of breath and wheezing. Cardiovascular: Negative for chest pain. Gastrointestinal: Positive for nausea and vomiting. Negative for abdominal pain and diarrhea. Genitourinary: Negative for dysuria. Musculoskeletal: Negative. Negative for back pain and neck stiffness. Skin: Negative for rash. Neurological: Positive for headaches. Negative for syncope. Psychiatric/Behavioral: Negative for confusion. All other systems reviewed and are negative. Vitals:    03/21/20 1345   BP: 108/65   Pulse: 80   Temp: 98.3 °F (36.8 °C)   SpO2: 100%   Weight: 74.4 kg (164 lb)   Height: 5' 3\" (1.6 m)            Physical Exam  Constitutional:       General: She is not in acute distress. Appearance: She is well-developed. HENT:      Head: Normocephalic. Mouth/Throat:      Pharynx: No oropharyngeal exudate or posterior oropharyngeal erythema. Eyes:      Pupils: Pupils are equal, round, and reactive to light. Neck:      Musculoskeletal: Normal range of motion and neck supple. Cardiovascular:      Rate and Rhythm: Normal rate. Heart sounds: No murmur. Pulmonary:      Effort: Pulmonary effort is normal.      Breath sounds: Normal breath sounds. Abdominal:      Palpations: Abdomen is soft. Tenderness: There is no abdominal tenderness. Musculoskeletal: Normal range of motion. Skin:     General: Skin is warm and dry. Capillary Refill: Capillary refill takes less than 2 seconds. Neurological:      Mental Status: She is alert.    Psychiatric:         Behavior: Behavior normal.          MDM       Procedures

## 2020-03-21 NOTE — DISCHARGE INSTRUCTIONS
Patient Education            use tylenol every 4 hr for headache. Dolor de ehsan: Instrucciones de cuidado  Headache: Care Instructions  Instrucciones de cuidado    Los nydia de [place] tienen muchas causas posibles. La mayoría de los nydia de ehsan no son señal de un problema más nancy y mejoran por sí solos. El tratamiento en el hogar podría ayudarlo a sentirse mejor con Miryam Beards. El médico lo garcia revisado minuciosamente, bella puede desarrollar problemas más tarde. Si nota algún problema o síntomas, busque tratamiento médico inmediatamente. La atención de seguimiento es ish parte clave de deleon tratamiento y seguridad. Asegúrese de hacer y acudir a todas las citas, y llame a deleon médico si está teniendo problemas. También es ish buena idea saber los resultados de jayme exámenes y mantener ish lista de los medicamentos que toni. ¿Cómo puede cuidarse en el [facility]? · No conduzca si ha tomado analgésicos (medicamentos para el dolor) recetados. · Descanse en un cuarto tranquilo y oscuro hasta que desaparezca el dolor de [place]. Cierre los ojos y trate de relajarse o dormirse. No eder la televisión ni ca. · Colóquese un paño frío y húmedo o [employer] compresa fría sobre la anupama adolorida de 10 a 21 minutos cada vez. Póngase un paño gayle entre la compresa fría y la piel. · Utilice ish toalla húmeda tibia o ish almohadilla térmica ajustada a baja temperatura para relajar los músculos tensos del ashley y los hombros.  · Pídale a alguien que le larissa masajes suaves en el ashley y los hombros.  · [place] los analgésicos exactamente luis le fueron indicados. ? Si el médico le recetó un analgésico, tómelo según las indicaciones. ? Si no está tomando un analgésico recetado, pregúntele a deleon médico si puede osmin sánchez de [employer].   · Tenga cuidado de no osmin analgésicos con mayor frecuencia que la permitida en las indicaciones porque los nydia de ehsan podrían empeorar o aparecer con mayor frecuencia [employer] que el medicamento pierda deleon [place]. · Preste atención a los nuevos síntomas que aparecen con el dolor de [place], [place], debilidad o entumecimiento, cambios en la visión o confusión. Podrían ser señales de un problema más grave. Para prevenir los nydia de ehsan  · [employer] un diario de jayme nydia de ehsan para que pueda averiguar qué los desencadena. Evitar los desencadenantes podría ayudar a prevenir los nydia de [place]. Anote cuándo empieza cada dolor de [place], cuánto dura y cómo es el dolor (palpitante, diana, punzante o sordo). Anote cualquier otro síntoma que haya tenido con el dolor de [place], [place] náuseas, destellos de toya o CODY, o sensibilidad a la toya brillante o a los ruidos quinten. Anote si el dolor de ehsan ocurrió cerca de deleon menstruación. Enumere todos los factores que pudieran sana desencadenado el dolor de [place], luis ciertos alimentos (chocolate, queso, vino) u olores, humo, luces brillantes, estrés o falta de sueño. · Encuentre maneras saludables de [facility]. Los nydia de [place] son más comunes pennie o marko después de un momento estresante. Tómese un tiempo para relajarse antes y después de hacer algo que le haya causado un dolor de ehsan en el pasado. · Trate de mantener jayme músculos relajados mediante ish buena postura. Revise si tiene [employer] de la [place]yl[place], la chava, el ashley y los hombros y trate de relajarlos. Cuando se siente en un escritorio, cambie de posición con frecuencia y estírese por 27 segundos cada hora. · Zee suficiente ejercicio y duerma bastante. · Coma en forma regular y neris. Largos períodos sin comer pueden provocar un dolor de ehsan. · Regálese un masaje. Algunas personas encuentran que los masajes hechos con regularidad son Maritza Brod para aliviar la tensión. · Limite la cafeína. No heidy demasiado café, té ni sodas.  Robbie no deje de consumir cafeína de repente, porque eso también puede provocarle nydia de [place]. · Reduzca la tensión en los ojos a causa de la computadora parpadeando con frecuencia y apartando la mirada de la pantalla a menudo. Asegúrese de tener lentes adecuados y de que deleon monitor esté colocado de manera correcta, luis a un brazo de distancia. · Busque ayuda si tiene depresión o ansiedad. Liat nydia de [place] podrían relacionarse con estas afecciones. El tratamiento puede prevenir los nydia de [place] y ayudar con los síntomas de ansiedad o depresión. ¿Cuándo debe pedir ayuda? Llame al 911 en cualquier momento que piense que puede necesitar atención de emergencia. Por ejemplo, llame si:    · Tiene señales de un ataque cerebral. Estas pueden incluir:  ? Parálisis, entumecimiento o debilidad repentinos en la chava, el brazo o la pierna, sobre todo si ocurre en un solo lado del cuerpo. ? Cambios repentinos en la visión. ? Dificultades repentinas para hablar. ? Confusión repentina o dificultad para comprender frases sencillas. ? Problemas repentinos para caminar o mantener el equilibrio. ? Dolor de [place] intenso y repentino, distinto de los nydia de ehsan anteriores.    Llame a deleon médico ahora mismo o busque atención médica inmediata si:    · Tiene un dolor de [employer] o peor.     · Deleon dolor de ehsan empeora mucho. ¿Dónde puede encontrar más información en inglés? Vaya a http://maciej-demarco.info/  Silvia Atkinson H286 en la búsqueda para aprender más acerca de \"Dolor de ehsan: Instrucciones de cuidado. \"  Revisado: 19 noviembre, 2019Versión del contenido: 12.4  © 1948-6578 Healthwise, [employer]. Las instrucciones de cuidado fueron adaptadas bajo licencia por Good Help Connections (which disclaims liability or warranty for this information). Si usted tiene [place] afección médica o sobre estas instrucciones, siempre pregunte a deleon profesional de ekta.  [employer], [employer] niega toda garantía o responsabilidad por deleon uso de esta información. Patient Education        Náuseas y vómito: Instrucciones de cuidado  Nausea and Vomiting: Care Instructions  Instrucciones de cuidado    Cuando tiene náuseas, podría sentirse débil y sudoroso y notar mucha saliva en deleon boca. Las náuseas suelen [employer]. La mayoría de las veces no hay que preocuparse por las náuseas y [address], bella estos pueden ser signos de [facility]. Dos causas comunes de náuseas y vómito son la gastroenteritis viral y la intoxicación por alimentos. Las náuseas y el vómito por gastroenteritis viral, por lo general, empiezan a mejorar en unas 24 horas. Las náuseas y el vómito debido a intoxicación por alimentos podrían durar de 12 a 48 horas. El médico lo ha revisado minuciosamente, bella puede desarrollar problemas más tarde. Si nota algún problema o síntomas nuevos, busque tratamiento médico inmediatamente. La atención de seguimiento es ish parte clave de deleon tratamiento y seguridad. Asegúrese de hacer y acudir a todas las citas, y llame a deleon médico si está teniendo problemas. También es ish buena idea saber los resultados de jayme exámenes y mantener ish lista de los medicamentos que toni. ¿Cómo puede cuidarse en el hogar? · Para prevenir la deshidratación, heidy abundantes líquidos, suficientes para que deleon orina sea de color amarillo mac o geronimo luis el agua. Opte por beber agua y otros líquidos rosie sin cafeína hasta que se sienta mejor. Si tiene ish enfermedad del riñón, del corazón o del hígado y tiene que [employer] líquidos, hable con deleon médico antes de aumentar deleon consumo. · Permanezca en la cama hasta que se sienta mejor. · Cuando pueda comer, empiece a consumir sopas claras (caldos), alimentos suaves y líquidos hasta que todos los síntomas hayan desaparecido por un período de 12 a 48 horas. Otras elecciones buenas incluyen pan alex seco, galletas saladas, cereal cocido y postre de gelatina, luis Jell-O.  ¿Cuándo debe pedir ayuda?   Llame al 911 toda vez que piense que puede necesitar atención de emergencia. Por ejemplo, llame si:    · Se desmayó (perdió el conocimiento).    Llame a deleon médico ahora mismo o busque atención médica inmediata si:    · Tiene síntomas de deshidratación, tales luis:  ? Ojos secos y boca seca. ? Orina solo poca cantidad de color oscuro. ? Tiene más sed de lo normal.     · Tiene dolor abdominal nuevo o que empeora.     · Tiene fiebre nueva o que [place].     · Vomita you o algo parecido a granos de café molido.    Preste especial atención a los cambios en deleon ekta y asegúrese de comunicarse con deleon médico si:    · Tiene náusea y vómito continuos.     · El vómito está empeorando.     · El vómito dura más de 2 días.     · No mejora luis se esperaba. ¿Dónde puede encontrar más información en inglés? [place] a http://maciej-demarco.info/  Dimitri Cruz H591 en la búsqueda para aprender más acerca de \"Náuseas y vómito: Instrucciones de cuidado. \"  Revisado: 26 junio, 2019Versión del contenido: 12.4  © 4459-2206 Healthwise, Incorporated. Las instrucciones de cuidado fueron adaptadas bajo licencia por Good Help Connections (which disclaims liability or warranty for this information). Si usted tiene [place] afección médica o sobre estas instrucciones, siempre pregunte a deleon profesional de ekta. Healthwise, Incorporated niega toda garantía o responsabilidad por deleon uso de esta información.

## 2020-03-21 NOTE — ED TRIAGE NOTES
Pt states she has been experiencing headache for the last few weeks. Had an appt with her PCP but apt was cancelled.  Pt c/o of n/v. States headaches are getting worse

## 2020-03-23 LAB
BACTERIA SPEC CULT: ABNORMAL
BACTERIA SPEC CULT: ABNORMAL
SERVICE CMNT-IMP: ABNORMAL

## 2020-04-07 ENCOUNTER — OFFICE VISIT (OUTPATIENT)
Dept: FAMILY MEDICINE CLINIC | Age: 30
End: 2020-04-07

## 2020-04-07 ENCOUNTER — HOSPITAL ENCOUNTER (OUTPATIENT)
Dept: LAB | Age: 30
Discharge: HOME | End: 2020-04-07

## 2020-04-07 ENCOUNTER — INITIAL PRENATAL (OUTPATIENT)
Dept: FAMILY MEDICINE CLINIC | Age: 30
End: 2020-04-07

## 2020-04-07 ENCOUNTER — HOSPITAL ENCOUNTER (OUTPATIENT)
Dept: LAB | Age: 30
Discharge: HOME | End: 2020-04-07
Payer: SELF-PAY

## 2020-04-07 VITALS
HEART RATE: 98 BPM | BODY MASS INDEX: 27.11 KG/M2 | RESPIRATION RATE: 16 BRPM | HEIGHT: 63 IN | WEIGHT: 153 LBS | SYSTOLIC BLOOD PRESSURE: 116 MMHG | TEMPERATURE: 98.2 F | DIASTOLIC BLOOD PRESSURE: 69 MMHG | OXYGEN SATURATION: 94 %

## 2020-04-07 VITALS
OXYGEN SATURATION: 94 % | WEIGHT: 153 LBS | BODY MASS INDEX: 27.11 KG/M2 | HEART RATE: 98 BPM | TEMPERATURE: 98.2 F | RESPIRATION RATE: 20 BRPM | HEIGHT: 63 IN | SYSTOLIC BLOOD PRESSURE: 116 MMHG | DIASTOLIC BLOOD PRESSURE: 69 MMHG

## 2020-04-07 DIAGNOSIS — O21.9 NAUSEA AND VOMITING IN PREGNANCY: ICD-10-CM

## 2020-04-07 DIAGNOSIS — Z34.91 ENCOUNTER FOR SUPERVISION OF LOW-RISK PREGNANCY IN FIRST TRIMESTER: Primary | ICD-10-CM

## 2020-04-07 DIAGNOSIS — Z34.91 ENCOUNTER FOR SUPERVISION OF LOW-RISK PREGNANCY IN FIRST TRIMESTER: ICD-10-CM

## 2020-04-07 DIAGNOSIS — R51.9 OCCIPITAL HEADACHE: ICD-10-CM

## 2020-04-07 LAB
ANTIBODY SCREEN, EXTERNAL: NEGATIVE
BILIRUB UR QL STRIP: NEGATIVE
CHLAMYDIA, EXTERNAL: NEGATIVE
ERYTHROCYTE [DISTWIDTH] IN BLOOD BY AUTOMATED COUNT: 15.2 % (ref 11.5–14.5)
GLUCOSE UR-MCNC: NEGATIVE MG/DL
HBSAG, EXTERNAL: NEGATIVE
HBV SURFACE AG SER QL: <0.1 INDEX
HBV SURFACE AG SER QL: NEGATIVE
HCG URINE, QL. (POC): POSITIVE
HCT VFR BLD AUTO: 42.3 % (ref 35–47)
HGB BLD-MCNC: 13.3 G/DL (ref 11.5–16)
HIV 1+2 AB+HIV1 P24 AG SERPL QL IA: NONREACTIVE
HIV, EXTERNAL: NON REACTIVE
HIV12 RESULT COMMENT, HHIVC: NORMAL
KETONES P FAST UR STRIP-MCNC: NORMAL MG/DL
MCH RBC QN AUTO: 28.7 PG (ref 26–34)
MCHC RBC AUTO-ENTMCNC: 31.4 G/DL (ref 30–36.5)
MCV RBC AUTO: 91.4 FL (ref 80–99)
N. GONORRHEA, EXTERNAL: NEGATIVE
NRBC # BLD: 0 K/UL (ref 0–0.01)
NRBC BLD-RTO: 0 PER 100 WBC
PH UR STRIP: 6.5 [PH] (ref 4.6–8)
PLATELET # BLD AUTO: 220 K/UL (ref 150–400)
PMV BLD AUTO: 12 FL (ref 8.9–12.9)
PROT UR QL STRIP: NEGATIVE
RBC # BLD AUTO: 4.63 M/UL (ref 3.8–5.2)
RPR, EXTERNAL: NON REACTIVE
RUBELLA, EXTERNAL: NORMAL
RUBV IGG SER-IMP: REACTIVE
RUBV IGG SERPL IA-ACNC: 214.4 IU/ML
SP GR UR STRIP: 1.02 (ref 1–1.03)
UA UROBILINOGEN AMB POC: NORMAL (ref 0.2–1)
URINALYSIS CLARITY POC: CLEAR
URINALYSIS COLOR POC: YELLOW
URINE BLOOD POC: NEGATIVE
URINE LEUKOCYTES POC: NORMAL
URINE NITRITES POC: NEGATIVE
VALID INTERNAL CONTROL?: YES
WBC # BLD AUTO: 9.2 K/UL (ref 3.6–11)

## 2020-04-07 PROCEDURE — 88175 CYTOPATH C/V AUTO FLUID REDO: CPT

## 2020-04-07 PROCEDURE — 87661 TRICHOMONAS VAGINALIS AMPLIF: CPT

## 2020-04-07 RX ORDER — PYRIDOXINE HCL (VITAMIN B6) 25 MG
25 TABLET ORAL DAILY
Qty: 90 TAB | Refills: 1 | Status: SHIPPED | OUTPATIENT
Start: 2020-04-07 | End: 2021-09-01

## 2020-04-07 RX ORDER — PRENATAL VIT 96/IRON FUM/FOLIC 27MG-0.8MG
1 TABLET ORAL DAILY
Qty: 90 TAB | Refills: 3 | Status: SHIPPED | OUTPATIENT
Start: 2020-04-07 | End: 2021-09-01

## 2020-04-07 NOTE — PATIENT INSTRUCTIONS
START pyridoxine 25mg every 8 hours as needed for nausea. START daily prenatal vitamin. Call [phone] to schedule your MRI. This will occur at [facility]. Schedule a virtual visit in 4 weeks. Manejo de las náuseas matutinas (del [facility]): Instrucciones de cuidado  Managing Morning Sickness: Care Instructions  Instrucciones de cuidado    Para muchas mujeres, la parte más difícil del principio del embarazo son las náuseas matutinas. Las náuseas matutinas pueden ser leves o quinten con ataques de vómito. Los síntomas pueden ser peores en la [place], aunque pueden presentarse en cualquier momento del día o la noche. Si tiene náuseas, vómito, o [place], busque [employer]. Puede osmin medidas sencillas en el [facility] para [employer] náuseas del [facility]. Estas incluyen [place] qué y cuándo come y evitar ciertos alimentos y [place]. Algunas mujeres descubren que la acupuntura y las bandas de acupresión en las [address] son de [facility]. La atención de seguimiento es ish parte clave de deleon tratamiento y seguridad. Asegúrese de hacer y acudir a todas las citas, y llame a deleon médico si está teniendo problemas. También es ish buena idea saber los resultados de jayme exámenes y mantener ish lista de los medicamentos que toni. ¿Cómo puede cuidarse en el [facility]? · Tenga comida en el estómago, bella no demasiada a la vez. Las náuseas podrían empeorar si tiene el estómago vacío. Consuma hiro o seis comidas pequeñas al día, en lugar de luis comidas abundantes. · Para las náuseas matutinas, coma un refrigerio pequeño, luis un par de [employer] o secas, antes de levantarse. Permita que deleon estómago se asiente pennie unos minutos antes de salir despacio de la cama. · Radha abundantes líquidos, suficientes para que deleon orina sea de color amarillo mac o transparente luis el agua.  Si tiene ish enfermedad del riñón, del corazón o del hígado y tiene que [place] líquidos, hable con deleon médico antes de aumentar deleon consumo. El té de [place] a algunas mujeres con las náuseas. · Coma más proteína, luis cortez, pescado, carne magra, frijoles (habichuelas), nueces y semillas. · Consuma alimentos con carbohidratos, luis lacie, cereales integrales, arroz y pasta. · Evite los olores y [employer] den náuseas. Los alimentos condimentados o grasosos, los jugos cítricos, la [place], [place] café y el té con Julius Ede a menudo las náuseas. · No heidy alcohol. · No fume. Trate de no estar [employer]. Si necesita ayuda para dejar de fumar, hable con deleon médico sobre programas y medicamentos para dejar de fumar. Estos pueden aumentar jayme probabilidades de dejar el hábito para siempre. · Si está tomando suplementos de kym, pregúntele a deleon médico si son necesarios. El kym puede [employer]. · Descanse mucho. El estrés y [address] náuseas matutinas. · Pregúntele a deleon médico si puede osmin medicamentos recetas o productos de venta johnathon, luis vitamina B6, doxilamina o jengibre, para [place]. Deleon médico puede decirle cuál es la dosis ferrari para usted. · [place] jayme vitaminas prenatales en la noche con el estómago lleno. ¿Cuándo debe pedir ayuda? Llame a deleon médico ahora mismo o busque atención médica inmediata si:    · Siente demasiado malestar estomacal luis para beber líquidos.     · Tiene síntomas de deshidratación, tales luis:  ? Ojos secos y boca seca. ? [place] orina de color oscuro. ? Más sed de la habitual.     · Tiene nuevos síntomas, luis diarrea, fiebre o dolor abdominal.    Preste especial atención a los cambios en deleon ekta y asegúrese de comunicarse con deleon médico si:    · Pierde peso.     · Tiene náuseas y vómito continuos. ¿Dónde puede encontrar más información en inglés?   Vaya a http://maciej-demarco.info/  Elayne Z080 en la búsqueda para aprender más acerca de \"Manejo de las náuseas matutinas (del Avelina Stein): Instrucciones de cuidado. \"  Revisado: 29 richey, 2019Versión del contenido: 12.4  © 6169-2609 Tamir Beard. Las instrucciones de cuidado fueron adaptadas bajo licencia por Good Help Connections (which disclaims liability or warranty for this information). Si usted tiene [place] afección médica o sobre estas instrucciones, siempre pregunte a deleon profesional de ekta. [employer] Incorporated niega toda garantía o responsabilidad por deleon uso de esta información.

## 2020-04-07 NOTE — PROGRESS NOTES
1. Have you been to the ER, urgent care clinic since your last visit? Hospitalized since your last visit? No    2. Have you seen or consulted any other health care providers outside of the [address] [place] [address] since your last visit? Include any pap smears or colon screening.  No  Reviewed record in preparation for visit and have necessary documentation  Pt did not bring medication to office visit for review    Goals that were addressed and/or need to be completed during or after this appointment include   Health Maintenance Due   Topic Date Due    PAP AKA CERVICAL CYTOLOGY  05/17/2019

## 2020-04-07 NOTE — PROGRESS NOTES
Subjective:   Nic Tang is a 34 y.o. female who is being seen today for her first obstetrical visit. This is a planned pregnancy. Patient's last menstrual period was 2020 (approximate). With estimated GA 11w0d with DENISE 20. [id number]  · 1st pregnancy - Delivery method: . Fetal weight: 7lbs 11oz. Complications: none. · 2nd pregnancy - Delivery method: SAB  · 3rd pregnancy - Delivery method: . Fetal weight: 7lbs 4.1oz. Complications: none. History of GDM or DM? No    History of GHTN or HTN? No    History of pre-eclampsia? No    Desires first trimester dating ultrasound? Yes    Desires second trimester fetal anatomy ultrasound at 20 weeks? Yes    Desires genetic screening? Yes, would like the quad screen. 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 : NO  Daily PNV?: NO  Partner involved?: YES  Does anyone make you feel unsafe physically, mentally or emotionally?: NO     Exposure history:              Cats/raw meat:  Does have outdoor cat. No litter box, no raw meat               CMV:  She is not in close contact with children on a regular basis     Family History:   Is there any history on the patient's or FOB's side of the following?:  Sickle cell?: NO  Thalassemia?: NO  Cystic Fibrosis?: NO  Down Syndrome?: NO  Birth defects?: NO  Neural tube defects?: NO    Allergies- reviewed:   No Known Allergies      Medications- reviewed:   No current outpatient medications on file. No current facility-administered medications for this visit.           Past Medical History- reviewed:  Past Medical History:   Diagnosis Date    Anemia 2012    Depression 2013    Vanishing twin syndrome 2016         Past Surgical History- reviewed:   No past surgical history on file. Immunizations- reviewed:   Immunization History   Administered Date(s) Administered    TDAP Vaccine 10/31/2012     Flu vaccine not indicated  Tdap last in 2016      ROS  : Denies vaginal bleeding and leaking of fluid  GI: Endorses occasional nausea and vomiting  Neuro: Headache      Objective:     Visit Vitals  /69 (BP 1 Location: Left arm, BP Patient Position: Sitting)   Pulse 98   Temp 98.2 °F (36.8 °C) (Oral)   Resp 16   Ht 5' 3\" (1.6 m)   Wt 153 lb (69.4 kg)   LMP 2020 (Approximate)   SpO2 94%   BMI 27.10 kg/m²       Physical Exam:  GENERAL APPEARANCE: alert, well appearing, in no apparent distress  LUNGS: clear to auscultation, no wheezes, rales or rhonchi, symmetric air entry  BREAST: normal breast, no lesions or nipple discharge  HEART: regular rate and rhythm, no murmurs  ABDOMEN: FHT present at 160bpm, , uterus with anterior placenta with colon pregnancy  BACK: no CVA tenderness  EXTERNAL GENITALIA: normal appearing vulva with no masses, tenderness or lesions  VAGINA: no abnormal discharge or lesions  CERVIX: no lesions or cervical motion tenderness  EXTREMITIES: no redness or tenderness in the calves or thighs, no edema  NEUROLOGICAL: alert, oriented, normal speech, no focal findings or movement disorder noted  SKIN: normal coloration and turgor, no rashes    Exam chaperoned by Raad Rodriguez LPN      ASSESSMENT AND PLAN:   Pregnancy at 11w0d by LMP, confirmed by 1st trimester dating ultrasound. · SIUP - Course of pregnancy discussed including visit schedule, routine U/S, glucola testing, etc. Discussed nutrition, toxoplasmosis precautions, sexual activity, exercise, environmental and work hazards, travel advice, screen for domestic violence, need for seat belts. Also discussed seafood, unpasteurized dairy products, deli meat, artificial sweeteners, and caffeine.   · Initial labs/specimens collected (CBC, CMP, blood type & screen, Rh antibody screen, rubella titer, HBsAg titer, RPR, HIV, UA w/ cx, pap smear,  Gonorrhea/chlamydia)  · Recommended starting daily prenatal vitamins  · Handout provided for OTC medications that are safe in pregnancy  · Request for 20 week fetal anatomy ultrasound faxed to [facility]. · Offered CF testing, CVS, Nuchal Translucency, MSAFP, amnio, and discussed NIPT  · Headache - pt was seen by neurology in 2019 and was referred for brain MRI for work-up of left arm numbness and tingling with concern for MS. Pt never got MRI of brain done. Have discussed with pt rescheduling MRI since the headache could be a manifestation of insidious neurologic etiology. Pt expressed understanding to schedule MRI at the end of May when imagining is able to be scheduled again. · N/V in pregnancy - Has occurred in every pregnancy. B6 sent      Follow-up in 4 week(s) for virtual visit, have discussed with pt. I have discussed the diagnosis with the patient and the intended plan as seen in the above orders. The patient has received an after-visit summary and questions were answered concerning future plans. I have discussed medication side effects and warnings with the patient as well. Informed pt to return to the office or go to the ER if she experiences vaginal bleeding, vaginal discharge, leaking of fluid, pelvic cramping.       Pt seen and discussed with Dr. Estefany Ghotra (attending physician)    Rachel Benavides MD   Resident, Family Medicine   4/7/2020

## 2020-04-07 NOTE — PROGRESS NOTES
1. Have you been to the ER, urgent care clinic since your last visit? Hospitalized since your last visit? No    2. Have you seen or consulted any other health care providers outside of the [address] [place] [address] since your last visit? Include any pap smears or colon screening.  No  Reviewed record in preparation for visit and have necessary documentation  Pt did not bring medication to office visit for review    Goals that were addressed and/or need to be completed during or after this appointment include   Health Maintenance Due   Topic Date Due    PAP AKA CERVICAL CYTOLOGY  05/17/2019

## 2020-04-08 LAB
ABO + RH BLD: NORMAL
BACTERIA SPEC CULT: NORMAL
BLOOD BANK CMNT PATIENT-IMP: NORMAL
BLOOD GROUP ANTIBODIES SERPL: NORMAL
CC UR VC: NORMAL
RPR SER QL: NONREACTIVE
SERVICE CMNT-IMP: NORMAL
SPECIMEN EXP DATE BLD: NORMAL

## 2020-04-09 LAB
VZV IGG SER IA-ACNC: 564 INDEX
VZV IGM SER IA-ACNC: <0.91 INDEX (ref 0–0.9)

## 2020-04-09 NOTE — PROGRESS NOTES
A positive, antibody negative. RPR, Hep B, and HIV negative. VZV and rubella immune. Negative urine culture. Pap smear negative, awaiting STI testing results. Normal CBC.

## 2020-04-10 LAB
C TRACH RRNA SPEC QL NAA+PROBE: NEGATIVE
N GONORRHOEA RRNA SPEC QL NAA+PROBE: NEGATIVE
SPECIMEN SOURCE: NORMAL
T VAGINALIS RRNA SPEC QL NAA+PROBE: NEGATIVE

## 2020-04-14 ENCOUNTER — TELEPHONE (OUTPATIENT)
Dept: FAMILY MEDICINE CLINIC | Age: 30
End: 2020-04-14

## 2020-04-14 DIAGNOSIS — R51.9 OCCIPITAL HEADACHE: Primary | ICD-10-CM

## 2020-04-14 NOTE — TELEPHONE ENCOUNTER
Andria Snell from scheduling dept called wanting to know if the order for MRI is clinically urgent or can it be scheduled after May 25.  Order was placed by Dr. Mikey Millan    [phone]

## 2020-05-14 ENCOUNTER — ROUTINE PRENATAL (OUTPATIENT)
Dept: FAMILY MEDICINE CLINIC | Age: 30
End: 2020-05-14

## 2020-05-14 ENCOUNTER — HOSPITAL ENCOUNTER (OUTPATIENT)
Dept: LAB | Age: 30
Discharge: HOME | End: 2020-05-14

## 2020-05-14 VITALS
DIASTOLIC BLOOD PRESSURE: 73 MMHG | WEIGHT: 158 LBS | BODY MASS INDEX: 28 KG/M2 | HEIGHT: 63 IN | SYSTOLIC BLOOD PRESSURE: 114 MMHG | HEART RATE: 89 BPM | RESPIRATION RATE: 15 BRPM | TEMPERATURE: 97.4 F | OXYGEN SATURATION: 99 %

## 2020-05-14 DIAGNOSIS — Z3A.16 16 WEEKS GESTATION OF PREGNANCY: ICD-10-CM

## 2020-05-14 DIAGNOSIS — Z3A.16 16 WEEKS GESTATION OF PREGNANCY: Primary | ICD-10-CM

## 2020-05-14 DIAGNOSIS — R51.9 NONINTRACTABLE HEADACHE, UNSPECIFIED CHRONICITY PATTERN, UNSPECIFIED HEADACHE TYPE: ICD-10-CM

## 2020-05-14 DIAGNOSIS — R51.9 TEMPORAL PAIN: ICD-10-CM

## 2020-05-14 LAB
BILIRUB UR QL STRIP: NEGATIVE
ERYTHROCYTE [SEDIMENTATION RATE] IN BLOOD: 16 MM/HR (ref 0–20)
GLUCOSE UR-MCNC: NEGATIVE MG/DL
KETONES P FAST UR STRIP-MCNC: NEGATIVE MG/DL
PH UR STRIP: 5.5 [PH] (ref 4.6–8)
PROT UR QL STRIP: NORMAL
SP GR UR STRIP: 1.03 (ref 1–1.03)
UA UROBILINOGEN AMB POC: NORMAL (ref 0.2–1)
URINALYSIS CLARITY POC: CLEAR
URINALYSIS COLOR POC: YELLOW
URINE BLOOD POC: NORMAL
URINE LEUKOCYTES POC: NORMAL
URINE NITRITES POC: NEGATIVE

## 2020-05-14 NOTE — PROGRESS NOTES
1. Have you been to the ER, urgent care clinic, or been hospitalized since your last visit? No      2. Have you seen or consulted any other health care providers outside of the [address] [place] [address] since your last visit? No     Reviewed record in preparation for visit and have necessary documentation  Goals that were addressed and/or need to be completed during or after this appointment include   There are no preventive care reminders to display for this patient. I have received verbal consent from Debbe Kussmaul to discuss any/all medical information while others present in the room.

## 2020-05-14 NOTE — PROGRESS NOTES
Return OB Visit     Subjective:   Vipul Navarro 34 y.o. Juan Soriano  DENISE: 10/27/2020, by Last Menstrual Period  GA:  16w2d. Pt complains of daily headaches since she found out that she was pregnant. They are bitemporal and also occipital. She states that laying her head down on a pillow hurts and simply touching the sides of her head is very painful. She is scheduled to have an MRI on 6/3/20. She has history of intermittent bouts of numbness in her fingers. She previously saw neurology and was supposed to have an MRI at that time, but it got cancelled. She is not having any focal neurologic deficits at this time. She is taking daily prenatal vitamin. Trying to stay well hydrated and eat a balanced diet. Denies vaginal bleeding or discharge, dysuria, LOF, nausea, vomiting, severe abdominal pain or cramping, HA, dizziness, vision changes, or RUQ pain. Past Medical History - Reviewed today  Patient Active Problem List   Diagnosis Code    Anemia D64.9    Depression F32.9    Panic attack F41.0         Medications - Reviewed today  Current Outpatient Medications   Medication Sig Dispense Refill    pyridoxine, vitamin B6, (VITAMIN B-6) 25 mg tablet Take 1 Tab by mouth daily. 90 Tab 1    prenatal EOKB09/ZSIY fum/folic (prenatal vitamin) 27 mg iron- 800 mcg tab tablet Take 1 Tab by mouth daily.  90 Tab 3         Allergies - Reviewed today  No Known Allergies      Family History - Reviewed today  Family History   Problem Relation Age of Onset    Diabetes Mother          Social History - Reviewed today  Social History     Socioeconomic History    Marital status:      Spouse name: Not on file    Number of children: Not on file    Years of education: Not on file    Highest education level: Not on file   Occupational History    Not on file   Social Needs    Financial resource strain: Not on file    Food insecurity     Worry: Not on file     Inability: Not on file   [employer] needs Medical: Not on file     Non-medical: Not on file   Tobacco Use    Smoking status: Never Smoker    Smokeless tobacco: Never Used   Substance and Sexual Activity    Alcohol use: No    Drug use: No    Sexual activity: Yes     Partners: Male     Birth control/protection: None   Lifestyle    Physical activity     Days per week: Not on file     Minutes per session: Not on file    Stress: Not on file   Relationships    Social connections     Talks on phone: Not on file     Gets together: Not on file     Attends Baptism service: Not on file     Active member of club or organization: Not on file     Attends meetings of clubs or organizations: Not on file     Relationship status: Not on file    Intimate partner violence     Fear of current or ex partner: Not on file     Emotionally abused: Not on file     Physically abused: Not on file     Forced sexual activity: Not on file   Other Topics Concern    Not on file   Social History Narrative    Not on file         Health Maintenance - Reviewed today    Pap: UTD, normal performed on 4/7/20    Objective:     Visit Vitals  /73   Pulse 89   Temp 97.4 °F (36.3 °C) (Oral)   Resp 15   Ht 5' 3\" (1.6 m)   Wt 158 lb (71.7 kg)   LMP 01/21/2020 (Approximate)   SpO2 99%   BMI 27.99 kg/m²       Physical Exam:  GENERAL APPEARANCE: alert, well appearing, in no apparent distress  LUNGS: clear to auscultation, no wheezes, rales or rhonchi, symmetric air entry  HEART: regular rate and rhythm, no murmurs  UTERUS: gravid,  FHT present, 130 bpm  EXTREMITIES: no redness or tenderness in the calves or thighs, no edema  NEUROLOGICAL: alert, oriented, normal speech, no focal findings or movement disorder noted, full neurologic exam performed. No CN deficits. Strength, sensation intact. Tenderness to palpation of occipital and bitemporal regions of head. Assessment and Plan   Patient is 16w2d  according to LMP, dates consistent with 10wk ultrasound.     · SIUP at 16w2d   - PNL: MACIEL Rh pos, Hgb 13.3, GC/chlam neg, VZV/rub imm, RPR/HIV NR, Hep B neg, Pap nml  - U/A showed 3+ LE, 1+ prot, neg glucose - will send culture. Will repeat UA at following visit to ensure protein in urine is not consistent  - Pt declines genetic screening  - Ultrasound for fetal Anomalies scheduled for 6/10/20 at 9:30AM  - Follow up in 4 weeks  · Headaches - pt has had headaches since pregnancy began. Concerns for neurologic process previously for intermittent numbness in extremities. Seen Dr. Rosemarie Reeder in past for this. MRI unable to be completed previously. MRI scheduled for 6/3.  - Provided pt with number for Dr. Wendy Polk office and asked her to call today to get a virtual visit appt for recommendations for better headache control during pregnancy as tylenol has not been helping much. - Checking ESR to rule out temporal arteritis    Labor precautions discussed, including: Regular painful contractions, lasting for greater than one hour, taking your breath away; any vaginal bleeding; any leakage of fluid; or absent or decreased fetal movement. Call M.D. on call if any of these symptoms or signs occur. I have discussed the diagnosis with the patient and the intended plan as seen in the above orders. The patient has received an after-visit summary and questions were answered concerning future plans. I have discussed medication side effects and warnings with the patient as well. Patient seen and discussed with Dr. Chapincito Garcia, supervising physician.      Elaina Beal MD  Family Medicine Resident

## 2020-05-14 NOTE — PATIENT INSTRUCTIONS
Weeks 14 to 18 of Your Pregnancy: Care Instructions Your Care Instructions During this time, you may start to \"show,\" so that you look pregnant to people around you. You may also notice some changes in your skin, such as itchy spots on your palms or acne on your face. Your baby is now able to pass urine, and your baby's first stool (meconium) is starting to collect in his or her intestines. Hair is also beginning to grow on your baby's head. At your next visit, between weeks 18 and 20, your doctor may do an ultrasound test. The test allows your doctor to check for certain problems. Your doctor can also tell the sex of your baby. This is a good time to think about whether you want to know whether your baby is a boy or a girl. Talk to your doctor about getting a flu shot to help keep you healthy during your pregnancy. As your pregnancy moves along, it is common to worry or feel anxious. Your body is changing a lot. And you are thinking about giving birth, the health of your baby, and becoming a parent. You can learn to cope with any anxiety and stress you feel. Follow-up care is a key part of your treatment and safety. Be sure to make and go to all appointments, and call your doctor if you are having problems. It's also a good idea to know your test results and keep a list of the medicines you take. How can you care for yourself at home? 
 Reduce stress 
  · Ask for help with cooking and housekeeping.  
  · Figure out who or what causes your stress. Avoid these people or situations as much as possible.  
  · Relax every day. Taking 10- to 15-minute breaks can make a big difference. Take a walk, listen to music, or take a warm bath.  
  · Learn relaxation techniques at prenatal or yoga class. Or buy a relaxation tape.  
  · List your fears about having a baby and becoming a parent. Share the list with someone you trust. Decide which worries are really small, and try to let them go. Exercise   · If you did not exercise much before pregnancy, start slowly. Walking is best. [employer], and do a little more every day.  
  · Brisk walking, easy jogging, low-impact aerobics, water aerobics, and yoga are good choices. Some sports, such as scuba diving, horseback riding, downhill skiing, gymnastics, and water skiing, are not a good idea.  
  · Try to do at least 2½ hours a week of moderate exercise, such as a fast walk. One way to do this is to be active 30 minutes a day, at least 5 days a week. It's fine to be active in blocks of 10 minutes or more throughout your day and week.  
  · Wear loose clothing. And wear shoes and a bra that provide good support.  
  · Warm up and cool down to start and finish your exercise.  
  · If you want to use weights, be sure to use light weights. They reduce stress on your joints.  
 Stay at the best weight for you 
  · Experts recommend that you gain about 1 pound a month during the first 3 months of your pregnancy.  
  · Experts recommend that you gain about 1 pound a week during your last 6 months of pregnancy, for a total weight gain of 25 to 35 pounds.  
  · If you are underweight, you will need to gain more weight (about 28 to 40 pounds).  
  · If you are overweight, you may not need to gain as much weight (about 15 to 25 pounds).  
  · If you are gaining weight too fast, use common sense. Exercise every day, and limit sweets, fast foods, and fats. Choose lean meats, fruits, and vegetables.  
  · If you are having twins or more, your doctor may refer you to a dietitian. Where can you learn more? Go to http://maciej-demarco.info/ Enter M873 in the search box to learn more about \"Weeks 14 to 18 of Your Pregnancy: Care Instructions. \" Current as of: May 29, 2019Content Version: 12.4 © 6271-2870 [employer].  
Care instructions adapted under license by [employer] (which disclaims liability or warranty for this information). If you have questions about a medical condition or this instruction, always ask your healthcare professional. [place] [address] any warranty or liability for your use of this information. Learning About When to Call Your Doctor During Pregnancy (Up to 20 Weeks) Your Care Instructions It's common to have concerns about what might be a problem during pregnancy. Although most pregnant women don't have any serious problems, it's important to know when to call your doctor if you have certain symptoms. These are general suggestions. Your doctor may give you some more information about when to call. When to call your doctor (up to 20 weeks) Call 911 anytime you think you may need emergency care. For example, call if: 
· You passed out (lost consciousness). Call your doctor now or seek immediate medical care if: 
· You have a fever. · You have vaginal bleeding. · You are dizzy or lightheaded, or you feel like you may faint. · You have symptoms of a urinary tract infection. These may include: 
? Pain or burning when you urinate. ? A frequent need to urinate without being able to pass much urine. ? Pain in the flank, which is just below the rib cage and above the waist on either side of the back. ? Blood in your urine. · You have belly pain. · You think you are having contractions. · You have a sudden release of fluid from your vagina. Watch closely for changes in your health, and be sure to contact your doctor if: 
· You have vaginal discharge that smells bad. · You have other concerns about your pregnancy. Follow-up care is a key part of your treatment and safety. Be sure to make and go to all appointments, and call your doctor if you are having problems. It's also a good idea to know your test results and keep a list of the medicines you take. Where can you learn more? Go to http://maciej-demarco.info/ Enter N901 in the search box to learn more about \"Learning About When to Call Your Doctor During Pregnancy (Up to 20 Weeks). \" 
Current as of: May 29, 2019Content Version: 12.4 © 7008-8262 [employer], Incorporated. Care instructions adapted under license by [employer] (which disclaims liability or warranty for this information). If you have questions about a medical condition or this instruction, always ask your healthcare professional. [address] any warranty or liability for your use of this information.

## 2020-05-14 NOTE — PROGRESS NOTES
I discussed the findings, assessment and plan in detail with the resident and agree with the resident's findings and plan as documented in the resident's note. Worrisme headaches. Neurology referral.    Jovani Broussard.  Kat Ramos M.D.

## 2020-05-15 LAB
BACTERIA SPEC CULT: NORMAL
CC UR VC: NORMAL
SERVICE CMNT-IMP: NORMAL

## 2020-05-21 ENCOUNTER — VIRTUAL VISIT (OUTPATIENT)
Dept: NEUROLOGY | Age: 30
End: 2020-05-21

## 2020-05-21 DIAGNOSIS — R51.9 ACUTE INTRACTABLE HEADACHE, UNSPECIFIED HEADACHE TYPE: ICD-10-CM

## 2020-05-21 DIAGNOSIS — R20.2 PARESTHESIA: Primary | ICD-10-CM

## 2020-05-21 NOTE — PROGRESS NOTES
Yoandy Lozoya is a 34 y.o. female who was seen by synchronous (real-time) audio-video technology on 5/21/2020. Subjective:   Yoandy Lozoya is a 29 y.o. 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 Also had episodes of L cheek numbness on and off but only happened 3 times and lasting for few secs. Exam also reveals clumsiness of the L hand and L foot. Considerations include multiple sclerosis.     Since March 2020, patient started having headache 10/10 described as tightness that starts in the back of her head and moves to bilateral temple area. (+) nausea (-) vomiting (-) photophobia (-) phonophobia. Still gets numbness of the L side but has not done MRI brain. Patient is now 17 weeks PU; DENISE late Oct        ROS:  Per HPI-  Otherwise 12 point ROS was negative    Social Hx:  Social History     Socioeconomic History    Marital status:      Spouse name: Not on file    Number of children: Not on file    Years of education: Not on file    Highest education level: Not on file   Tobacco Use    Smoking status: Never Smoker    Smokeless tobacco: Never Used   Substance and Sexual Activity    Alcohol use: No    Drug use: No    Sexual activity: Yes     Partners: Male     Birth control/protection: None       Meds:  Current Outpatient Medications on File Prior to Visit   Medication Sig Dispense Refill    pyridoxine, vitamin B6, (VITAMIN B-6) 25 mg tablet Take 1 Tab by mouth daily. 90 Tab 1    prenatal MMPU23/OPAZ fum/folic (prenatal vitamin) 27 mg iron- 800 mcg tab tablet Take 1 Tab by mouth daily. 90 Tab 3     No current facility-administered medications on file prior to visit. Imaging:    CT Results (recent):  No results found for this or any previous visit.     MRI Results (recent):  No results found for this or any previous visit. IR Results (recent):  No results found for this or any previous visit. VAS/US Results (recent):  No results found for this or any previous visit. Reviewed records in [place] [facility] [place] and [employer] tab today    Lab Review     Hospital Outpatient Visit on 05/14/2020   Component Date Value Ref Range Status    Special Requests: 05/14/2020 NO SPECIAL REQUESTS    Final    Edwards Count 05/14/2020     Final                    Value:>100,000  COLONIES/mL      Culture result: 05/14/2020 MIXED UROGENITAL EVETTE ISOLATED    Final    Sed rate, automated 05/14/2020 16  0 - 20 mm/hr Final   Routine Prenatal on 05/14/2020   Component Date Value Ref Range Status    Color (UA POC) 05/14/2020 Yellow   Final    Clarity (UA POC) 05/14/2020 Clear   Final    Glucose (UA POC) 05/14/2020 Negative  Negative Final    Bilirubin (UA POC) 05/14/2020 Negative  Negative Final    Ketones (UA POC) 05/14/2020 Negative  Negative Final    Specific gravity (UA POC) 05/14/2020 1.030  1.001 - 1.035 Final    Blood (UA POC) 05/14/2020 Trace  Negative Final    pH (UA POC) 05/14/2020 5.5  4.6 - 8.0 Final    Protein (UA POC) 05/14/2020 1+  Negative Final    Urobilinogen (UA POC) 05/14/2020 0.2 mg/dL  0.2 - 1 Final    Nitrites (UA POC) 05/14/2020 Negative  Negative Final    Leukocyte esterase (UA POC) 05/14/2020 3+  Negative Final   Hospital Outpatient Visit on 04/07/2020   Component Date Value Ref Range Status    Chlamydia amplification 04/07/2020 Negative  NEG   Final    N. gonorrhoeae amplification 04/07/2020 Negative  NEG   Final    Trichomonas amplification 04/07/2020 Negative  NEG   Final    Testing performed by nucleic acid amplification method.     Specimen Source 04/07/2020 CERVICAL    Final   Hospital Outpatient Visit on 04/07/2020   Component Date Value Ref Range Status    Crossmatch Expiration 04/07/2020 04/10/2020   Final    ABO/Rh(D) 04/07/2020 A POSITIVE   Final    Antibody screen 04/07/2020 NEG   Final    Comment 04/07/2020 SAMPLE NOT USABLE FOR CROSSMATCH   Final    WBC 04/07/2020 9.2  3.6 - 11.0 K/uL Final    RBC 04/07/2020 4.63  3.80 - 5.20 M/uL Final    HGB 04/07/2020 13.3  11.5 - 16.0 g/dL Final    HCT 04/07/2020 42.3  35.0 - 47.0 % Final    MCV 04/07/2020 91.4  80.0 - 99.0 FL Final    MCH 04/07/2020 28.7  26.0 - 34.0 PG Final    MCHC 04/07/2020 31.4  30.0 - 36.5 g/dL Final    RDW 04/07/2020 15.2* 11.5 - 14.5 % Final    PLATELET 16/16/6713 555  150 - 400 K/uL Final    MPV 04/07/2020 12.0  8.9 - 12.9 FL Final    NRBC 04/07/2020 0.0  0  WBC Final    ABSOLUTE NRBC 04/07/2020 0.00  0.00 - 0.01 K/uL Final    Special Requests: 04/07/2020 NO SPECIAL REQUESTS    Final    [facility] Count 04/07/2020     Final                    Value:>100,000  COLONIES/mL      Culture result: 04/07/2020 MIXED UROGENITAL EVETTE ISOLATED    Final    Hepatitis B surface Ag 04/07/2020 <0.10  Index Final    Hep B surface Ag Interp. 04/07/2020 Negative  NEG   Final    HIV 1/2 Interpretation 04/07/2020 NONREACTIVE  NR   Final    HIV 1/2 result comment 04/07/2020 SEE NOTE    Final    Comment: While this assay is highly sensitive, a non-reactive/negative result for HIV antibodies HIV-1 and HIV-2 and p24 antigen, does not exclude the possibility of exposure to or infection with HIV. HIV antibodies and/or p24 antigen may be undetectable in some stages of the infection and in some clinical conditions. Test performed by the [employer] HIV Ag/Ab Combo (CHIV), 4th generation assay. Recommend consulting relevant CDC guidelines for informing test subject of the result and its interpretation.  RPR 04/07/2020 NONREACTIVE  NR   Final    Rubella, IgG Qt. 04/07/2020 214.40  IU/ML Final    Rubella IgG, QL 04/07/2020 REACTIVE    Final    Comment: IgG antibody to rubella detected, which may indicate a current or previous exposure or immunization to rubella.   Results may vary depending on . Method used is [facility]      V. zoster, IgG 04/07/2020 564  Immune >165 index Final    Comment: (NOTE)                                Negative          <135                                Equivocal    135 - 165                                Positive          >165  A positive result generally indicates exposure to the  pathogen or administration of specific immunoglobulins,  but it is not indication of active infection or stage  of disease.   Performed At: [facility]  [place] [address] [id number]  Geneva Yun MD TU:[id number]      V. zoster Ab, IgM 04/07/2020 <0.91  0.00 - 0.90 index Final    Comment: (NOTE)                                Negative          <0.91                                Borderline  0.91 - 1.09                                Positive          >1.09  Performed At: [facility]  [place] [address] [id number]  Geneva Yun MD FC:[id number]     Initial Prenatal on 04/07/2020   Component Date Value Ref Range Status    Color (UA POC) 04/07/2020 Yellow   Final    Clarity (UA POC) 04/07/2020 Clear   Final    Glucose (UA POC) 04/07/2020 Negative  Negative Final    Bilirubin (UA POC) 04/07/2020 Negative  Negative Final    Ketones (UA POC) 04/07/2020 Trace  Negative Final    Specific gravity (UA POC) 04/07/2020 1.025  1.001 - 1.035 Final    Blood (UA POC) 04/07/2020 Negative  Negative Final    pH (UA POC) 04/07/2020 6.5  4.6 - 8.0 Final    Protein (UA POC) 04/07/2020 Negative  Negative Final    Urobilinogen (UA POC) 04/07/2020 0.2 mg/dL  0.2 - 1 Final    Nitrites (UA POC) 04/07/2020 Negative  Negative Final    Leukocyte esterase (UA POC) 04/07/2020 1+  Negative Final    VALID INTERNAL CONTROL POC 04/07/2020 Yes   Final    HCG urine, Ql. (POC) 04/07/2020 Positive  Negative Final   Admission on 03/21/2020, Discharged on 03/21/2020   Component Date Value Ref Range Status    Group A Strep Ag ID 03/21/2020 NEGATIVE NEG   Final    Special Requests: 03/21/2020 NO SPECIAL REQUESTS    Final    Culture result: 03/21/2020 LIGHT STREPTOCOCCI, BETA HEMOLYTIC GROUP F NOT GENERALLY CONSIDERED A PATHOGEN*   Final    Culture result: 03/21/2020 MODERATE NORMAL RESPIRATORY EVETTE    Final         Objective:     General: alert, cooperative, no distress   Mental  status: mental status: alert, oriented to person, place, and time, normal mood, behavior, speech, dress, motor activity, and thought processes   Resp: resp: normal effort and no respiratory distress   Neuro: neuro: no gross deficits   Skin: skin: no discoloration or lesions of concern on visible areas     Due to this being a TeleHealth evaluation, many elements of the physical examination are unable to be assessed. Assessment:       ICD-10-CM ICD-9-CM    1. Paresthesia R20.2 782.0    2. Acute intractable headache, unspecified headache type R51 784.0      Evaluate for Multiple scelrosis and migraine. Mass lesion    Plan:   1. Patient is scheduled for MRI Brain on June 9  2. Depending on above, will consider prednisone taper  3. Discussed due to her pregnancy, medications that we can give her are limited. Patient states Tylenol makes her \"high\"  4. Advise to go to ER if symptoms worsen    Follow-up and Dispositions    · Return for review of results. CPT Codes 11471-74511 for Established Patients may apply to this Telehealth Visit    We discussed the expected course, resolution and complications of the diagnosis(es) in detail. Medication risks, benefits, costs, interactions, and alternatives were discussed as indicated. I advised her to contact the office if her condition worsens, changes or fails to improve as anticipated. She expressed understanding with the diagnosis(es) and plan.        Pursuant to the emergency declaration under the [facility] [place], [address]5 waiver authority and the [employer] Appropriations Act, this Virtual  Visit was conducted, with patient's consent, to reduce the patient's risk of exposure to COVID-19 and provide continuity of care for an established patient. Services were provided through a video synchronous discussion virtually to substitute for in-person clinic visit.        Issa Lobato MD  Diplomate, American Board of Psychiatry and Neurology  Diplomate, Neuromuscular Medicine  Diplomate, American Board of Electrodiagnostic Medicine

## 2020-05-21 NOTE — LETTER
5/21/2020 3:27 PM 
 
Patient:  Debbe Kussmaul YOB: 1990 Date of Visit: 5/21/2020 Dear No Recipients: Thank you for referring Ms. Abram Hector to me for evaluation/treatment. Below are the relevant portions of my assessment and plan of care. If you have questions, please do not hesitate to call me. I look forward to following Ms. La Orlando along with you. Sincerely, Lily Arguelles MD

## 2020-06-03 ENCOUNTER — HOSPITAL ENCOUNTER (OUTPATIENT)
Dept: MRI IMAGING | Age: 30
Discharge: HOME | End: 2020-06-03
Attending: STUDENT IN AN ORGANIZED HEALTH CARE EDUCATION/TRAINING PROGRAM
Payer: SELF-PAY

## 2020-06-03 DIAGNOSIS — R51.9 OCCIPITAL HEADACHE: ICD-10-CM

## 2020-06-03 PROCEDURE — 70551 MRI BRAIN STEM W/O DYE: CPT

## 2020-06-04 ENCOUNTER — TELEPHONE (OUTPATIENT)
Dept: FAMILY MEDICINE CLINIC | Age: 30
End: 2020-06-04

## 2020-06-04 NOTE — TELEPHONE ENCOUNTER
Called and scheduled patient for a VV on Monday 6/8/20 with Dr. Sherald Saint. Patient states daily h/as.

## 2020-06-04 NOTE — TELEPHONE ENCOUNTER
Called pt to discuss normal brain MRI results. Pt states she still continues to have headaches. Have instructed her that this is good news that there is no mass contributing to headache. Advised follow-up with neurology. Will reach out to their office and have provided pt with their number.

## 2020-06-05 ENCOUNTER — TELEPHONE (OUTPATIENT)
Dept: FAMILY MEDICINE CLINIC | Age: 30
End: 2020-06-05

## 2020-06-08 ENCOUNTER — VIRTUAL VISIT (OUTPATIENT)
Dept: NEUROLOGY | Age: 30
End: 2020-06-08

## 2020-06-08 DIAGNOSIS — R51.9 ACUTE INTRACTABLE HEADACHE, UNSPECIFIED HEADACHE TYPE: Primary | ICD-10-CM

## 2020-06-08 NOTE — PROGRESS NOTES
Chief Complaint   Patient presents with    Follow-up     Virtual visit--pregnant with daily headaches.

## 2020-06-08 NOTE — PROGRESS NOTES
Debbe Kussmaul is a 34 y.o. female who was seen by synchronous (real-time) audio-video technology on 6/8/2020. Subjective:   Mirela Haley is a 29 y. o. 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 Also had episodes of L cheek numbness on and off but only happened 3 times and lasting for few secs. Exam also reveals clumsiness of the L hand and L foot.  Since March 2020, patient started having headache 10/10 described as tightness that starts in the back of her head and moves to bilateral temple area. (+) nausea (-) vomiting (-) photophobia (-) phonophobia.     Patient still getting headaches but not as bad. Takes Tylenol. Getting intermittent numbness of the hands.       Patient is now 20 weeks PU; DENISE late Oct      ROS:  Per HPI-  Otherwise 12 point ROS was negative    Social Hx:  Social History     Socioeconomic History    Marital status:      Spouse name: Not on file    Number of children: Not on file    Years of education: Not on file    Highest education level: Not on file   Tobacco Use    Smoking status: Never Smoker    Smokeless tobacco: Never Used   Substance and Sexual Activity    Alcohol use: No    Drug use: No    Sexual activity: Yes     Partners: Male     Birth control/protection: None       Meds:  Current Outpatient Medications on File Prior to Visit   Medication Sig Dispense Refill    pyridoxine, vitamin B6, (VITAMIN B-6) 25 mg tablet Take 1 Tab by mouth daily. 90 Tab 1    prenatal XDEE04/DISR fum/folic (prenatal vitamin) 27 mg iron- 800 mcg tab tablet Take 1 Tab by mouth daily. 90 Tab 3     No current facility-administered medications on file prior to visit. Imaging:    CT Results (recent):  No results found for this or any previous visit.     MRI Results (recent):  Results from Hospital Encounter encounter on 06/03/20   MRI BRAIN WO CONT    Narrative EXAM: MRI BRAIN WO CONT    INDICATION: Occipital headache. COMPARISON: None. CONTRAST: None. TECHNIQUE:    Multiplanar multisequence acquisition without contrast of the brain. FINDINGS:  The ventricles are normal in size and position. The brain parenchyma has normal  signal characteristics. There is no acute infarct, hemorrhage, extra-axial fluid  collection, or mass effect. There is no cerebellar tonsillar herniation. Expected arterial flow-voids are present. The paranasal sinuses, mastoid air cells, and middle ears are clear. The orbital  contents are within normal limits. No significant osseous or scalp lesions are  identified. Impression IMPRESSION:     1. Normal brain MRI. IR Results (recent):  No results found for this or any previous visit. VAS/US Results (recent):  No results found for this or any previous visit.     Reviewed records in [facility] and [facility] tab today    Lab Review     Hospital Outpatient Visit on 05/14/2020   Component Date Value Ref Range Status    Special Requests: 05/14/2020 NO SPECIAL REQUESTS    Final    [place] Count 05/14/2020     Final                    Value:>100,000  COLONIES/mL      Culture result: 05/14/2020 MIXED UROGENITAL EVETTE ISOLATED    Final    Sed rate, automated 05/14/2020 16  0 - 20 mm/hr Final   Routine Prenatal on 05/14/2020   Component Date Value Ref Range Status    Color (UA POC) 05/14/2020 Yellow   Final    Clarity (UA POC) 05/14/2020 Clear   Final    Glucose (UA POC) 05/14/2020 Negative  Negative Final    Bilirubin (UA POC) 05/14/2020 Negative  Negative Final    Ketones (UA POC) 05/14/2020 Negative  Negative Final    Specific gravity (UA POC) 05/14/2020 1.030  1.001 - 1.035 Final    Blood (UA POC) 05/14/2020 Trace  Negative Final    pH (UA POC) 05/14/2020 5.5  4.6 - 8.0 Final    Protein (UA POC) 05/14/2020 1+  Negative Final    Urobilinogen (UA POC) 05/14/2020 0.2 mg/dL  0.2 - 1 Final    Nitrites (UA POC) 05/14/2020 Negative  Negative Final    Leukocyte esterase (UA POC) 05/14/2020 3+  Negative Final   Hospital Outpatient Visit on 04/07/2020   Component Date Value Ref Range Status    Chlamydia amplification 04/07/2020 Negative  NEG   Final    N. gonorrhoeae amplification 04/07/2020 Negative  NEG   Final    Trichomonas amplification 04/07/2020 Negative  NEG   Final    Testing performed by nucleic acid amplification method.  Specimen Source 04/07/2020 CERVICAL    Final   Hospital Outpatient Visit on 04/07/2020   Component Date Value Ref Range Status    Crossmatch Expiration 04/07/2020 04/10/2020   Final    ABO/Rh(D) 04/07/2020 A POSITIVE   Final    Antibody screen 04/07/2020 NEG   Final    Comment 04/07/2020 SAMPLE NOT USABLE FOR CROSSMATCH   Final    WBC 04/07/2020 9.2  3.6 - 11.0 K/uL Final    RBC 04/07/2020 4.63  3.80 - 5.20 M/uL Final    HGB 04/07/2020 13.3  11.5 - 16.0 g/dL Final    HCT 04/07/2020 42.3  35.0 - 47.0 % Final    MCV 04/07/2020 91.4  80.0 - 99.0 FL Final    MCH 04/07/2020 28.7  26.0 - 34.0 PG Final    MCHC 04/07/2020 31.4  30.0 - 36.5 g/dL Final    RDW 04/07/2020 15.2* 11.5 - 14.5 % Final    PLATELET 45/63/4159 780  150 - 400 K/uL Final    MPV 04/07/2020 12.0  8.9 - 12.9 FL Final    NRBC 04/07/2020 0.0  0  WBC Final    ABSOLUTE NRBC 04/07/2020 0.00  0.00 - 0.01 K/uL Final    Special Requests: 04/07/2020 NO SPECIAL REQUESTS    Final    [place] Count 04/07/2020     Final                    Value:>100,000  COLONIES/mL      Culture result: 04/07/2020 MIXED UROGENITAL EVETTE ISOLATED    Final    Hepatitis B surface Ag 04/07/2020 <0.10  Index Final    Hep B surface Ag Interp.  04/07/2020 Negative  NEG   Final    HIV 1/2 Interpretation 04/07/2020 NONREACTIVE  NR   Final    HIV 1/2 result comment 04/07/2020 SEE NOTE    Final    Comment: While this assay is highly sensitive, a non-reactive/negative result for HIV antibodies HIV-1 and HIV-2 and p24 antigen, does not exclude the possibility of exposure to or infection with HIV. HIV antibodies and/or p24 antigen may be undetectable in some stages of the infection and in some clinical conditions. Test performed by the [employer] HIV Ag/Ab Combo (CHIV), 4th generation assay. Recommend consulting relevant CDC guidelines for informing test subject of the result and its interpretation.  RPR 04/07/2020 NONREACTIVE  NR   Final    Rubella, IgG Qt. 04/07/2020 214.40  IU/ML Final    Rubella IgG, QL 04/07/2020 REACTIVE    Final    Comment: IgG antibody to rubella detected, which may indicate a current or previous exposure or immunization to rubella. Results may vary depending on . Method used is [facility]      V. zoster, IgG 04/07/2020 564  Immune >165 index Final    Comment: (NOTE)                                Negative          <135                                Equivocal    135 - 165                                Positive          >165  A positive result generally indicates exposure to the  pathogen or administration of specific immunoglobulins,  but it is not indication of active infection or stage  of disease.   Performed At: [address] [id number]  Inessa Johnson MD [id number]      V. zoster Ab, IgM 04/07/2020 <0.91  0.00 - 0.90 index Final    Comment: (NOTE)                                Negative          <0.91                                Borderline  0.91 - 1.09                                Positive          >1.09  Performed At: [address] [id number]  Inessa Johnson MD WJ:[id number]     Initial Prenatal on 04/07/2020   Component Date Value Ref Range Status    Color (UA POC) 04/07/2020 Yellow   Final    Clarity (UA POC) 04/07/2020 Clear   Final    Glucose (UA POC) 04/07/2020 Negative  Negative Final    Bilirubin (UA POC) 04/07/2020 Negative Negative Final    Ketones (UA POC) 04/07/2020 Trace  Negative Final    Specific gravity (UA POC) 04/07/2020 1.025  1.001 - 1.035 Final    Blood (UA POC) 04/07/2020 Negative  Negative Final    pH (UA POC) 04/07/2020 6.5  4.6 - 8.0 Final    Protein (UA POC) 04/07/2020 Negative  Negative Final    Urobilinogen (UA POC) 04/07/2020 0.2 mg/dL  0.2 - 1 Final    Nitrites (UA POC) 04/07/2020 Negative  Negative Final    Leukocyte esterase (UA POC) 04/07/2020 1+  Negative Final    VALID INTERNAL CONTROL POC 04/07/2020 Yes   Final    HCG urine, Ql. (POC) 04/07/2020 Positive  Negative Final   Admission on 03/21/2020, Discharged on 03/21/2020   Component Date Value Ref Range Status    Group A Strep Ag ID 03/21/2020 NEGATIVE   NEG   Final    Special Requests: 03/21/2020 NO SPECIAL REQUESTS    Final    Culture result: 03/21/2020 LIGHT STREPTOCOCCI, BETA HEMOLYTIC GROUP F NOT GENERALLY CONSIDERED A PATHOGEN*   Final    Culture result: 03/21/2020 MODERATE NORMAL RESPIRATORY EVETTE    Final         Objective:     General: alert, cooperative, no distress   Mental  status: mental status: alert, oriented to person, place, and time, normal mood, behavior, speech, dress, motor activity, and thought processes   Resp: resp: normal effort and no respiratory distress   Neuro: neuro: no gross deficits   Skin: skin: no discoloration or lesions of concern on visible areas     Due to this being a TeleHealth evaluation, many elements of the physical examination are unable to be assessed. Assessment:       ICD-10-CM ICD-9-CM    1. Acute intractable headache, unspecified headache type R51 784.0      Paresthesia of hands, possible carpal tunnel syndrome    20 weeks PU; DENISE late Oct    Plan:   1. Discussed MRI brain is normal with no white matter lesions or mass lesions  2. Trial of Vit B2 100 mg 2 caps BID for migraine prevention  3. Tylenol prn for headache  4.  Advise bilateral wrist splint at night    Follow-up and Dispositions · Return in about 2 months (around 8/8/2020). CPT Codes 63694-31084 for Established Patients may apply to this Telehealth Visit      We discussed the expected course, resolution and complications of the diagnosis(es) in detail. Medication risks, benefits, costs, interactions, and alternatives were discussed as indicated. I advised her to contact the office if her condition worsens, changes or fails to improve as anticipated. She expressed understanding with the diagnosis(es) and plan. Pursuant to the emergency declaration under the [address] [place] [address], [facility] waiver authority and the [employer] Act, this Virtual  Visit was conducted, with patient's consent, to reduce the patient's risk of exposure to COVID-19 and provide continuity of care for an established patient. Services were provided through a video synchronous discussion virtually to substitute for in-person clinic visit.        Med Simon MD  Diplomate, American Board of Psychiatry and Neurology  Diplomate, Neuromuscular Medicine  Diplomate, American Board of Electrodiagnostic Medicine

## 2020-06-10 ENCOUNTER — HOSPITAL ENCOUNTER (OUTPATIENT)
Dept: PERINATAL CARE | Age: 30
Discharge: HOME | End: 2020-06-10
Attending: OBSTETRICS & GYNECOLOGY
Payer: SELF-PAY

## 2020-06-10 PROCEDURE — 76805 OB US >/= 14 WKS SNGL FETUS: CPT | Performed by: OBSTETRICS & GYNECOLOGY

## 2020-06-10 NOTE — PROGRESS NOTES
Return OB Visit     Subjective:   Milton Regalado 34 y.o. [place]  DENISE: 10/27/2020, by Last Menstrual Period  GA:  20w1d. does not have complaints. Followed up with neurology regarding numbness and tingling and headaches. Brain MRI normal. Taking B2 and tylenol. Wearing night splints. is taking daily prenatal vitamin. Trying to stay well hydrated and eat a balanced diet. Denies vaginal bleeding or discharge, dysuria, LOF, nausea, vomiting, severe abdominal pain or cramping, HA, dizziness, vision changes, or RUQ pain. +FM. Past Medical History - Reviewed today  Patient Active Problem List   Diagnosis Code    Anemia D64.9    Depression F32.9    Panic attack F41.0         Medications - Reviewed today  Current Outpatient Medications   Medication Sig Dispense Refill    riboflavin, vitamin B2, (Vitamin B-2) 100 mg tablet Take 100 mg by mouth two (2) times a day.  pyridoxine, vitamin B6, (VITAMIN B-6) 25 mg tablet Take 1 Tab by mouth daily. 90 Tab 1    prenatal ALMG61/GFEL fum/folic (prenatal vitamin) 27 mg iron- 800 mcg tab tablet Take 1 Tab by mouth daily.  90 Tab 3         Allergies - Reviewed today  No Known Allergies      Family History - Reviewed today  Family History   Problem Relation Age of Onset    Diabetes Mother          Social History - Reviewed today  Social History     Socioeconomic History    Marital status:      Spouse name: Not on file    Number of children: Not on file    Years of education: Not on file    Highest education level: Not on file   Occupational History    Not on file   Social Needs    Financial resource strain: Not on file    Food insecurity     Worry: Not on file     Inability: Not on file    Transportation needs     Medical: Not on file     Non-medical: Not on file   Tobacco Use    Smoking status: Never Smoker    Smokeless tobacco: Never Used   Substance and Sexual Activity    Alcohol use: No    Drug use: No    Sexual activity: Yes Partners: Male     Birth control/protection: None   Lifestyle    Physical activity     Days per week: Not on file     Minutes per session: Not on file    Stress: Not on file   Relationships    Social connections     Talks on phone: Not on file     Gets together: Not on file     Attends Church service: Not on file     Active member of club or organization: Not on file     Attends meetings of clubs or organizations: Not on file     Relationship status: Not on file    Intimate partner violence     Fear of current or ex partner: Not on file     Emotionally abused: Not on file     Physically abused: Not on file     Forced sexual activity: Not on file   Other Topics Concern    Not on file   Social History Narrative    Not on file         Health Maintenance - Reviewed today  Pap: UTD    Objective:     Visit Vitals  BP 90/59   Pulse 84   Temp 98.3 °F (36.8 °C) (Oral)   Resp 16   Ht 5' 3\" (1.6 m)   Wt 159 lb 12.8 oz (72.5 kg)   LMP 01/21/2020 (Approximate)   SpO2 100%   BMI 28.31 kg/m²       Physical Exam:  GENERAL APPEARANCE: alert, well appearing, in no apparent distress  UTERUS: gravid, FHT present, 140 bpm      Assessment and Plan   Patient is 20w1d  according to LMP, dates consistent with 1st trimester ultrasound. · SIUP - PNL: A, Rh pos, Hgb 13.3, GC/chlam neg, VZV/rub imm, RPR/HIV NR, Hep B neg, Pap nml  - U/A showed 2+ LE with 1+ Nitrites, will send culture. Pt asymptomatic  - Pt declines genetic screening  - 1 hour OGTT at next visit  - Ultrasound for fetal Anomalies done on 6/10/20 which showed normal anatomy   - Follow up in 4 weeks    · Headaches with N/T in hand - Followed by neurology. Normal Brain MRI on 6/9/20. N/T thought to be 2/2 to carpal tunnel, recommended night splints. - Trial of Vit B2 BID for migraine prevention. Tylenol PRN.      Labor precautions discussed, including: Regular painful contractions, lasting for greater than one hour, taking your breath away; any vaginal bleeding; any leakage of fluid; or absent or decreased fetal movement. Call M.D. on call if any of these symptoms or signs occur. I have discussed the diagnosis with the patient and the intended plan as seen in the above orders. The patient has received an after-visit summary and questions were answered concerning future plans. I have discussed medication side effects and warnings with the patient as well.     Patient seen and discussed with Dr. Emmanuelle Coronel MD  [facility] Medicine Resident

## 2020-06-11 ENCOUNTER — OFFICE VISIT (OUTPATIENT)
Dept: FAMILY MEDICINE CLINIC | Age: 30
End: 2020-06-11

## 2020-06-11 ENCOUNTER — HOSPITAL ENCOUNTER (OUTPATIENT)
Dept: LAB | Age: 30
Discharge: HOME | End: 2020-06-11

## 2020-06-11 VITALS
DIASTOLIC BLOOD PRESSURE: 59 MMHG | HEART RATE: 84 BPM | BODY MASS INDEX: 28.31 KG/M2 | OXYGEN SATURATION: 100 % | HEIGHT: 63 IN | WEIGHT: 159.8 LBS | SYSTOLIC BLOOD PRESSURE: 90 MMHG | RESPIRATION RATE: 16 BRPM | TEMPERATURE: 98.3 F

## 2020-06-11 DIAGNOSIS — Z3A.20 20 WEEKS GESTATION OF PREGNANCY: Primary | ICD-10-CM

## 2020-06-11 DIAGNOSIS — R82.90 ABNORMAL URINE: ICD-10-CM

## 2020-06-11 LAB
BILIRUB UR QL STRIP: NEGATIVE
GLUCOSE UR-MCNC: NEGATIVE MG/DL
KETONES P FAST UR STRIP-MCNC: NORMAL MG/DL
PH UR STRIP: 7 [PH] (ref 4.6–8)
PROT UR QL STRIP: NEGATIVE
SP GR UR STRIP: 1.02 (ref 1–1.03)
UA UROBILINOGEN AMB POC: NORMAL (ref 0.2–1)
URINALYSIS CLARITY POC: NORMAL
URINALYSIS COLOR POC: NORMAL
URINE BLOOD POC: NEGATIVE
URINE LEUKOCYTES POC: NORMAL
URINE NITRITES POC: POSITIVE

## 2020-06-11 RX ORDER — RIBOFLAVIN (VITAMIN B2) 100 MG
100 TABLET ORAL 2 TIMES DAILY
COMMUNITY
End: 2021-09-01

## 2020-06-11 NOTE — PROGRESS NOTES
I discussed the findings, assessment and plan in detail with the resident and agree with the resident's findings and plan as documented in the resident's note. Allie Alejo M.D.

## 2020-06-11 NOTE — PROGRESS NOTES
Identified pt with two pt identifiers(name and ). Reviewed record in preparation for visit and have obtained necessary documentation. Chief Complaint   Patient presents with    Routine Prenatal Visit      Results for orders placed or performed in visit on 20   AMB POC URINALYSIS DIP STICK AUTO W/O MICRO   Result Value Ref Range    Color (UA POC) Dark Yellow     Clarity (UA POC) Cloudy     Glucose (UA POC) Negative Negative    Bilirubin (UA POC) Negative Negative    Ketones (UA POC) Trace Negative    Specific gravity (UA POC) 1.020 1.001 - 1.035    Blood (UA POC) Negative Negative    pH (UA POC) 7.0 4.6 - 8.0    Protein (UA POC) Negative Negative    Urobilinogen (UA POC) 1 mg/dL 0.2 - 1    Nitrites (UA POC) Positive Negative    Leukocyte esterase (UA POC) 2+ Negative         There are no preventive care reminders to display for this patient. Coordination of Care Questionnaire:  :   1) Have you been to an emergency room, urgent care, or hospitalized since your last visit? If yes, where when, and reason for visit? no       2. Have seen or consulted any other health care provider since your last visit? If yes, where when, and reason for visit? Yes- Dr. Mary Roche (neuro)      Patient is accompanied by self I have received verbal consent from Cesia Freitas to discuss any/all medical information while they are present in the room.

## 2020-06-14 LAB
BACTERIA SPEC CULT: ABNORMAL
CC UR VC: ABNORMAL
SERVICE CMNT-IMP: ABNORMAL

## 2020-06-15 ENCOUNTER — TELEPHONE (OUTPATIENT)
Dept: FAMILY MEDICINE CLINIC | Age: 30
End: 2020-06-15

## 2020-06-15 DIAGNOSIS — R82.71 ASYMPTOMATIC BACTERIURIA DURING PREGNANCY: Primary | ICD-10-CM

## 2020-06-15 DIAGNOSIS — O99.891 ASYMPTOMATIC BACTERIURIA DURING PREGNANCY: Primary | ICD-10-CM

## 2020-06-15 RX ORDER — CEPHALEXIN 500 MG/1
500 CAPSULE ORAL 4 TIMES DAILY
Qty: 20 CAP | Refills: 0 | Status: SHIPPED | OUTPATIENT
Start: 2020-06-15 | End: 2020-06-20

## 2020-07-28 ENCOUNTER — ROUTINE PRENATAL (OUTPATIENT)
Dept: FAMILY MEDICINE CLINIC | Age: 30
End: 2020-07-28

## 2020-07-28 ENCOUNTER — HOSPITAL ENCOUNTER (OUTPATIENT)
Dept: LAB | Age: 30
Discharge: HOME | End: 2020-07-28

## 2020-07-28 VITALS
OXYGEN SATURATION: 98 % | TEMPERATURE: 98.4 F | DIASTOLIC BLOOD PRESSURE: 66 MMHG | SYSTOLIC BLOOD PRESSURE: 107 MMHG | BODY MASS INDEX: 28.88 KG/M2 | HEART RATE: 89 BPM | RESPIRATION RATE: 16 BRPM | WEIGHT: 163 LBS | HEIGHT: 63 IN

## 2020-07-28 DIAGNOSIS — R51.9 NONINTRACTABLE HEADACHE, UNSPECIFIED CHRONICITY PATTERN, UNSPECIFIED HEADACHE TYPE: ICD-10-CM

## 2020-07-28 DIAGNOSIS — Z3A.27 27 WEEKS GESTATION OF PREGNANCY: Primary | ICD-10-CM

## 2020-07-28 DIAGNOSIS — Z3A.27 27 WEEKS GESTATION OF PREGNANCY: ICD-10-CM

## 2020-07-28 DIAGNOSIS — R82.71 ASYMPTOMATIC BACTERIURIA DURING PREGNANCY: ICD-10-CM

## 2020-07-28 DIAGNOSIS — O99.891 ASYMPTOMATIC BACTERIURIA DURING PREGNANCY: ICD-10-CM

## 2020-07-28 LAB
BILIRUB UR QL STRIP: NEGATIVE
GLUCOSE UR-MCNC: NEGATIVE MG/DL
KETONES P FAST UR STRIP-MCNC: NEGATIVE MG/DL
PH UR STRIP: 7 [PH] (ref 4.6–8)
PROT UR QL STRIP: NEGATIVE
SP GR UR STRIP: 1.02 (ref 1–1.03)
UA UROBILINOGEN AMB POC: NORMAL (ref 0.2–1)
URINALYSIS CLARITY POC: CLEAR
URINALYSIS COLOR POC: YELLOW
URINE BLOOD POC: NEGATIVE
URINE LEUKOCYTES POC: NORMAL
URINE NITRITES POC: NEGATIVE

## 2020-07-28 NOTE — PATIENT INSTRUCTIONS
Semanas 26 a 30 de tyler embarazo: Instrucciones de cuidado Weeks 26 to 30 of Your Pregnancy: Care Instructions Instrucciones de cuidado Ahora usted se encuentra en el último trimestre del [facility]. Tyler bebé está creciendo rápidamente. Y es probable que sienta que tyler bebé se mueve con más frecuencia. Es posible que tyler médico le diga que cuente la cantidad de patadas que da tyler bebé. La espalda puede dolerle mientras tyler cuerpo se acostumbra al tamaño y a la longitud de tyler bebé. Si todavía no le rivera aplicado la vacuna Tdap (tétanos, difteria y tos [place]) pennie lawanda [facility], hable con tyler médico acerca de aplicársela. Lynn Pato a proteger a tyler recién nacido contra la infección por tos ferina. Pennie lawanda tiempo, es importante que se cuide y preste atención a lo que tyler cuerpo necesita. Si tiene [employer], busque formas de estar con tyler michelle que satisfagan tyler nivel de comodidad y deseo. Utilice las recomendaciones proporcionadas en esta hoja de cuidados para encontrar tyler propia manera de vivir la sexualidad. La atención de seguimiento es ish parte clave de tyler tratamiento y seguridad. Asegúrese de hacer y acudir a todas las citas, y llame a tyler médico si está teniendo problemas. También es ish buena idea saber los resultados de jayme exámenes y mantener ish lista de los medicamentos que toni. Cómo puede cuidarse en el hogar? [place] tyler trabajo con calma · Tómese descansos frecuentes. Si es posible, deje de trabajar cuando esté cansada y descanse pennie la hora del almuerzo. · Vaya al baño cada 2 horas. · Cambie de posición con frecuencia. Si está sentada pennie mucho tiempo, póngase de pie y camine. · Si está marcos pennie mucho tiempo, apoye un pie sobre un banco bajo, flexionando la rodilla. Después de estar marcos pennie mucho tiempo, siéntese con los pies elevados. · [id number] [facility] [address], las sustancias químicas y el humo del tabaco. 
Aniket Seamen tyler sexualidad a tyler Isadore Netter · [employer] sexuales pennie el embarazo está neris, a menos que deleon médico le diga que no. · Podría tener un gran deseo sexual o estar completamente desinteresada. · El abdomen cada vez más denilson podría hacer difícil encontrar ish buena posición pennie el coito. Experimente y explore. · Cuando deleon michelle le toque los senos, podría tener contracciones en [place]. · Un masaje en la espalda podría aliviar el dolor de espalda o los cólicos que a veces se sienten después del [place]. Aprenda sobre el trabajo de [place] prematuro · Esté alerta a las señales del trabajo de [place]. Es posible que esté comenzando el Lulu Solar de parto si: ? Tiene cólicos similares a los del período menstrual, con o sin náuseas. ? Tiene aproximadamente 4 contracciones o más en 20 minutos, o alrededor de 8 o más en 1 hora, incluso después de sana tomado un vaso de agua y estar en reposo. ? Tiene un dolor sordo (leve bella continuo) en la anupama baja de la espalda que no desaparece cuando cambia de posición. ? Siente dolor o presión en la pelvis que aparece y desaparece con determinada regularidad. ? Tiene espasmos intestinales o síntomas similares a los de la gripe, con o sin diarrea. ? Nota un aumento o un cambio en el flujo vaginal. El flujo podría ser Darrick Jacqueline, tener consistencia mucosa, ser Loann Loveless rastros de [place]. ? Se le rompe la jn. · Si greg que ha comenzado un trabajo de [place] prematuro: ? Radha 2 o 3 vasos de [place] o jugo. No beber suficientes líquidos puede provocar contracciones. ? Detenga lo que esté haciendo, y vacíe la vejiga. Luego, acuéstese sobre el lado ilana pennie al menos 1 hora. ? Mientras descansa de jamil, ubique deleon esternón. Coloque los dedos en el punto blando marko debajo de él. Mueva los dedos hacia abajo en dirección al ombligo para encontrar la parte superior del Hellen Riser. Compruebe si está tenso. ? Las contracciones pueden ser débiles o intensas.  Registre jayme contracciones pennie [employer]. Cuente ish contracción desde el comienzo de ish hasta el comienzo de [place]. ? Mackenzie Chandray contracciones quinten que no ocurren con la regularidad de un patrón reciben el nombre de contracciones de [place]. Estas son \"contracciones de práctica\", bella no indican el inicio del [place]. Por lo general, se detienen si cambia de [place]. ? Si tiene contracciones regulares, llame a deleon médico. 

Dónde puede encontrar más información en inglés? Marlena jacome [email or web address]/ Dionne Kins M688 en la búsqueda para aprender más acerca de \"Semanas 26 a 30 de deleon embarazo: Instrucciones de cuidado. \" Revisado: 11 febrero, 2020               Versión del contenido: 12.5 © 4300-5278 Healthwise, [place]. Las instrucciones de cuidado fueron adaptadas bajo licencia por [employer] (which disclaims liability or warranty for this information). Si usted tiene [place] afección médica o sobre estas instrucciones, siempre pregunte a deleon profesional de ekta. [employer], [place] niega toda garantía o responsabilidad por deleon uso de esta información.

## 2020-07-28 NOTE — PROGRESS NOTES
Return OB Visit       Subjective:   Laura Lopez 34 y.o. Karen Campoverde  DENISE: 10/27/2020, by Last Menstrual Period  GA:  27w0d. LOF: None  Vaginal bleeding: None  Fetal movement (after 20 weeks): Yes  Contractions: None    Taking prenatals and vitamin B. Reports she has not had any headaches in the past month. Denies any dizziness, CP, SOB, cough, or abdominal pain. Medications- reviewed:   Current Outpatient Medications   Medication Sig    riboflavin, vitamin B2, (Vitamin B-2) 100 mg tablet Take 100 mg by mouth two (2) times a day.  pyridoxine, vitamin B6, (VITAMIN B-6) 25 mg tablet Take 1 Tab by mouth daily.  prenatal [id number] fum/folic (prenatal vitamin) 27 mg iron- 800 mcg tab tablet Take 1 Tab by mouth daily. No current facility-administered medications for this visit. 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   Immunizations- reviewed:   Immunization History   Administered Date(s) Administered    TDAP Vaccine 10/31/2012         Objective:     Visit Vitals  /66 (BP 1 Location: Left arm, BP Patient Position: Sitting)   Pulse 89   Temp 98.4 °F (36.9 °C) (Temporal)   Resp 16   Ht 5' 3\" (1.6 m)   Wt 163 lb (73.9 kg)   LMP 01/21/2020 (Approximate)   SpO2 98%   BMI 28.87 kg/m²       Physical Exam:  See OB episode     Labs  Results for orders placed or performed in visit on 07/28/20   AMB POC URINALYSIS DIP STICK AUTO W/O MICRO     Status: None   Result Value Ref Range Status    Color (UA POC) Yellow  Final    Clarity (UA POC) Clear  Final    Glucose (UA POC) Negative Negative Final    Bilirubin (UA POC) Negative Negative Final    Ketones (UA POC) Negative Negative Final    Specific gravity (UA POC) 1.020 1.001 - 1.035 Final    Blood (UA POC) Negative Negative Final    pH (UA POC) 7.0 4.6 - 8.0 Final Protein (UA POC) Negative Negative Final    Urobilinogen (UA POC) 0.2 mg/dL 0.2 - 1 Final    Nitrites (UA POC) Negative Negative Final    Leukocyte esterase (UA POC) 1+ Negative Final         Assessment         ICD-10-CM ICD-9-CM    1. 27 weeks gestation of pregnancy  Z3A.27 V22.2 GLUCOSE, GESTATIONAL 1 HR TOLERANCE      TETANUS, DIPHTHERIA TOXOIDS AND ACELLULAR PERTUSSIS VACCINE (TDAP), IN INDIVIDS. >=7, IM      AMB POC URINALYSIS DIP STICK AUTO W/O MICRO      CULTURE, URINE      TN IMMUNIZ ADMIN,1 SINGLE/COMB VAC/TOXOID   2. Asymptomatic bacteriuria during pregnancy  O99.89 646.50     R82.71     3. Nonintractable headache, unspecified chronicity pattern, unspecified headache type  R51 784.0          Plan   34 y.o. Wiley Kingo 27w0d DENISE 10/27/2020, by Last Menstrual Period here for return OB visit       · SI[facility] - PNL: A, Rh pos, Hgb 13.3, GC/chlam neg, VZV/rub imm, RPR/HIV NR, Hep B neg, Pap nml, dating by LMP consistent with US at 10w 6d  - UA showed 1+ LE with NEG Nitrites. Pt asymptomatic. Will check culture as last culture positive for e coli. - Pt declined genetic screening  - 1 hour OGTT today  - Tdap today  - Ultrasound for fetal Anomalies done on 6/10/20 which showed normal anatomy   - Follow up in 2 weeks for virtual prenatal visit and in 4 weeks for in person prenatal visit     · Headaches with N/T in hand - RESOLVED. Pt reports no headaches in the past month. Followed by neurology. Normal Brain MRI on 6/9/20. N/T thought to be 2/2 to carpal tunnel, recommended night splints. - Continue Vit B2 BID for migraine prevention. Tylenol PRN. Continue to f/u with Neurology.      · UTI - positive urine culture for e coli 6/11/20 treated with Keflex  -UA today showed showed 1+ LE with NEG Nitrites  -F/u urine culture      Orders Placed This Encounter    TN IMMUNIZ ADMIN,1 SINGLE/COMB VAC/TOXOID    CULTURE, URINE     Standing Status:   Future     Standing Expiration Date:   7/29/2021    TETANUS, DIPHTHERIA TOXOIDS AND ACELLULAR PERTUSSIS VACCINE (TDAP), IN INDIVIDS. >=7, IM    GLUCOSE, GESTATIONAL 1 HR TOLERANCE     Standing Status:   Future     Standing Expiration Date:   7/29/2021    AMB POC URINALYSIS DIP STICK AUTO W/O MICRO     Labor precautions discussed, including: Regular painful contractions, lasting for greater than one hour, taking your breath away; any vaginal bleeding; any leakage of fluid; or absent or decreased fetal movement. Call M.D. on call if any of these symptoms or signs occur. I have discussed the diagnosis with the patient and the intended plan as seen in the above orders. The patient has received an after-visit summary and questions were answered concerning future plans. I have discussed medication side effects and warnings with the patient as well. Informed pt to return to the office or go to the ER if she experiences vaginal bleeding, vaginal discharge, leaking of fluid, pelvic cramping.     Pt seen and discussed with Dr. Golden Steward (attending physician)    Vera Garica MD  Family Medicine Resident

## 2020-07-28 NOTE — PROGRESS NOTES
1. Have you been to the ER, urgent care clinic since your last visit? Hospitalized since your last visit? No    2. Have you seen or consulted any other health care providers outside of the [address] [place] [address] since your last visit? Include any pap smears or colon screening.  No  Reviewed record in preparation for visit and have necessary documentation  Pt did not bring medication to office visit for review  Goals that were addressed and/or need to be completed during or after this appointment include   Health Maintenance Due   Topic Date Due    OB 3RD TRIMESTER TDAP  07/28/2020

## 2020-07-28 NOTE — PROGRESS NOTES
Pt is a 34 y.o.  at 27w0d by LMP c/w 1st trimester US for routine prenatal care. Rh: Positive  GBS: N/A  Vaginal bleeding: NO  LOF: NO  Fetal movement: Normal  FHT's: 140's  Delivery plan:  Anticipate       GTT and TDaP today

## 2020-07-29 ENCOUNTER — TELEPHONE (OUTPATIENT)
Dept: FAMILY MEDICINE CLINIC | Age: 30
End: 2020-07-29

## 2020-07-29 DIAGNOSIS — Z3A.27 27 WEEKS GESTATION OF PREGNANCY: Primary | ICD-10-CM

## 2020-07-29 LAB
BACTERIA SPEC CULT: NORMAL
SERVICE CMNT-IMP: NORMAL

## 2020-07-29 NOTE — TELEPHONE ENCOUNTER
Received message from lab    GLUCOSE, GESTATIONAL 1 HR TOLERANCE     Not suitable for analysis.  Will have pt return to office for retest  Please re order this test

## 2020-08-03 ENCOUNTER — TELEPHONE (OUTPATIENT)
Dept: NEUROLOGY | Age: 30
End: 2020-08-03

## 2020-08-05 ENCOUNTER — TELEPHONE (OUTPATIENT)
Dept: NEUROLOGY | Age: 30
End: 2020-08-05

## 2020-08-08 NOTE — PROGRESS NOTES
Results noted. Urine culture negative. Still waiting on patient to return for repeat 1hr GTT. Patient did a 1hr GTT, but the blood sample sent to the lab was \"Not suitable for analysis\". Pt to return for repeat 1hr GTT.      Keke Del Toro MD  PGY3 Family Medicine Resident

## 2020-08-21 ENCOUNTER — OFFICE VISIT (OUTPATIENT)
Dept: FAMILY MEDICINE CLINIC | Age: 30
End: 2020-08-21
Payer: MEDICAID

## 2020-08-21 VITALS
SYSTOLIC BLOOD PRESSURE: 107 MMHG | HEIGHT: 63 IN | DIASTOLIC BLOOD PRESSURE: 72 MMHG | HEART RATE: 90 BPM | TEMPERATURE: 97.4 F | RESPIRATION RATE: 16 BRPM | WEIGHT: 171 LBS | BODY MASS INDEX: 30.3 KG/M2 | OXYGEN SATURATION: 97 %

## 2020-08-21 DIAGNOSIS — Z34.93 PRENATAL CARE IN THIRD TRIMESTER: Primary | ICD-10-CM

## 2020-08-21 DIAGNOSIS — Z3A.27 27 WEEKS GESTATION OF PREGNANCY: ICD-10-CM

## 2020-08-21 DIAGNOSIS — K64.4 EXTERNAL HEMORRHOID: ICD-10-CM

## 2020-08-21 LAB
BILIRUB UR QL STRIP: NEGATIVE
GLUCOSE UR-MCNC: NEGATIVE MG/DL
KETONES P FAST UR STRIP-MCNC: NEGATIVE MG/DL
PH UR STRIP: 8.5 [PH] (ref 4.6–8)
PROT UR QL STRIP: NEGATIVE
SP GR UR STRIP: 1.02 (ref 1–1.03)
UA UROBILINOGEN AMB POC: ABNORMAL (ref 0.2–1)
URINALYSIS CLARITY POC: CLEAR
URINALYSIS COLOR POC: YELLOW
URINE BLOOD POC: NEGATIVE
URINE LEUKOCYTES POC: ABNORMAL
URINE NITRITES POC: NEGATIVE

## 2020-08-21 PROCEDURE — 0502F SUBSEQUENT PRENATAL CARE: CPT | Performed by: STUDENT IN AN ORGANIZED HEALTH CARE EDUCATION/TRAINING PROGRAM

## 2020-08-21 PROCEDURE — 81003 URINALYSIS AUTO W/O SCOPE: CPT | Performed by: STUDENT IN AN ORGANIZED HEALTH CARE EDUCATION/TRAINING PROGRAM

## 2020-08-21 NOTE — PROGRESS NOTES
Pt is a 34 y.o.  at 30w3d by LMP c/w 1st trimester US for routine prenatal care. Rh: Positive  GBS: N/A  Vaginal bleeding: NO  LOF: NO  Fetal movement: Normal  FHT's: 130's  Delivery plan: Anticipate     GTT obtained today. Had been done at last visit but lab was not able to run sample for some reason.

## 2020-08-21 NOTE — PROGRESS NOTES
Chief Complaint   Patient presents with    Routine Prenatal Visit     Body mass index is 30.29 kg/m². 1. Have you been to the ER, urgent care clinic since your last visit? Hospitalized since your last visit? No    2. Have you seen or consulted any other health care providers outside of the [address] [place] [address] since your last visit? Include any pap smears or colon screening. No    Reviewed record in preparation for visit and have necessary documentation  Pt did not bring medication to office visit for review  Information was given to pt on Advanced Directives, Living Will  Information was given on Shingles Vaccine  Opportunity was given for questions  Goals that were addressed and/or need to be completed after this appointment include: There are no preventive care reminders to display for this patient.

## 2020-08-21 NOTE — PROGRESS NOTES
Return OB Visit     Subjective:   Man Chavez is a 34 y.o. [address] at 30w3d by LMP (01/21/20) c/w 1st trimester ultrasound. Estimated Date of Delivery: 10/27/20    LOF: NO  Vaginal bleeding: None  Fetal movement (after 20 weeks): Yes  Contractions: No  Dysuria: No  Headaches, blurred vision, RUQ pain: NO  Taking prenatal vitamins: Yes    Concerns today: Pt complains of a pimple like protrusion in her anal region, noticed it 3 weeks ago. It is not painful, she denies straining before having a bowel movement. She also complains of having 2 episodes of a non-radiating sharp pain in suprapubic region, over a 2 week period, last for 1-2 min. She is currently not having pain in her suprapubic region. Allergies   No Known Allergies  Medications:   Current Outpatient Medications   Medication Sig    riboflavin, vitamin B2, (Vitamin B-2) 100 mg tablet Take 100 mg by mouth two (2) times a day.  pyridoxine, vitamin B6, (VITAMIN B-6) 25 mg tablet Take 1 Tab by mouth daily.  prenatal [id number] fum/folic (prenatal vitamin) 27 mg iron- 800 mcg tab tablet Take 1 Tab by mouth daily. No current facility-administered medications for this visit. 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 No pertinent surgical history.   Social History:  Social History     Tobacco Use    Smoking status: Never Smoker    Smokeless tobacco: Never Used   Substance Use Topics    Alcohol use: No    Drug use: No     Immunizations:   Immunization History   Administered Date(s) Administered    TDAP Vaccine 10/31/2012    Tdap 07/28/2020     Tdap: 7/28/2020    Objective     Visit Vitals  /72 (BP 1 Location: Left arm, BP Patient Position: Sitting)   Pulse 90   Temp 97.4 °F (36.3 °C) (Oral)   Resp 16   Ht 5' 3\" (1.6 m)   Wt 171 lb (77.6 kg)   LMP 01/21/2020 (Approximate)   SpO2 97%   BMI 30.29 kg/m²       Physical Exam  GENERAL APPEARANCE: alert, well appearing, in no apparent distress  ABDOMEN: gravid, fundal height 31 cm, FHT present at 130 bpm  PSYCH: normal mood and affect  ANAL EXAM: lump protruding from the anus    Labs  Recent Results (from the past 12 hour(s))   AMB POC URINALYSIS DIP STICK AUTO W/O MICRO    Collection Time: 20 10:16 AM   Result Value Ref Range    Color (UA POC) Yellow     Clarity (UA POC) Clear     Glucose (UA POC) Negative Negative    Bilirubin (UA POC) Negative Negative    Ketones (UA POC) Negative Negative    Specific gravity (UA POC) 1.020 1.001 - 1.035    Blood (UA POC) Negative Negative    pH (UA POC) 8.5 (A) 4.6 - 8.0    Protein (UA POC) Negative Negative    Urobilinogen (UA POC) 0.2 mg/dL 0.2 - 1    Nitrites (UA POC) Negative Negative    Leukocyte esterase (UA POC) 1+ Negative     A/P    Yaz Grossman is a 34 y.o  at 30 weeks 3 days by LMP c/w 1st trimester ultrasound, here for a return OB visit. DENISE: 10/27/20    1. Prenatal care in third trimester: FHR is 130 bpm. Baby is Cephalic. Pt is having a baby girl. Urinalysis is negative for nitrites and protein. BP: 107/72  · Follow up on 1hr GTT test.  · Follow up in 2 weeks. 2. External hemorrhoid  · Use over the counter cream for hemorrhoid. Labor precautions discussed, including: Regular painful contractions, lasting for greater than one hour, taking your breath away; any vaginal bleeding; any leakage of fluid; or absent or decreased fetal movement. Call M.D. on call if any of these symptoms or signs occur. I have discussed the diagnosis with the patient and the intended plan as seen in the above orders. The patient has received an after-visit summary and questions were answered concerning future plans. I have discussed medication side effects and warnings with the patient as well.  Informed pt to return to the office or go to the ER if she experiences vaginal bleeding, vaginal discharge, leaking of fluid, pelvic cramping.     Patient discussed with Dr. Sally Doyle (Attending Physician)    Isabela Benjamin MD  Family Medicine Resident

## 2020-08-21 NOTE — PATIENT INSTRUCTIONS
Early Stage of Labor at Home: Care Instructions Your Care Instructions If you came to the hospital while in early labor, your doctor may have asked if you want to labor at home until your contractions are stronger. Many women stay at home during early labor. This is often the longest part of the birthing process. It may last up to 2 to 3 days. Contractions are mild to moderate and shorter (about 30 to 45 seconds). You can usually keep talking during them. Contractions may also be irregular, about 5 to 20 minutes apart. They may even stop for a while. It helps to stay as relaxed as you can during this time. You can spend some or all of your early labor at home or anywhere else you may be comfortable. If you live far from the hospital or birthing center, you may want to think about going somewhere nearby so you can get back to the hospital quickly. For some women, there may be benefits to staying home during early labor, such as avoiding medicines or procedures. As labor progresses, you'll shift from early labor to active labor. During this time, contractions get more intense. They occur more often, about every 2 to 3 minutes. They also last longer, about 50 to 70 seconds. You will feel them even when you change positions and walk or move around. It may be hard to tell if you are in active labor. If you aren't sure, call your doctor or midwife. As your labor progresses, check in with your doctor or midwife about when to come back to the hospital or birthing center. You may have special instructions if your water broke or you tested positive for group B strep. Follow-up care is a key part of your treatment and safety. Be sure to make and go to all appointments, and call your doctor if you are having problems. It's also a good idea to know your test results and keep a list of the medicines you take. How can you care for yourself at home? · Get support. Having a support person with you from early labor until after childbirth can have a positive effect on childbirth. · Find distractions. During early labor, you can walk, play cards, watch TV, or listen to music to help take your mind off your contractions. · Ask your partner, labor , or  for a massage. Shoulder and low back massage during contractions may ease your pain. Strong massage of the back muscles (counterpressure) during contractions may help relieve the pain of back labor. Tell your labor  exactly where to push and how hard to push. · Use imagery. This means using your imagination to decrease your pain. For instance, to help manage pain, picture your contractions as waves rolling over you. Picture a peaceful place, such as a beach or mountain stream, to help you relax between contractions. · Change positions during labor. Walking, kneeling, or sitting on a big rubber ball (birth ball) are good options. · Use focused breathing techniques. Breathing in a rhythm can distract you from pain. · Take a warm shower or bath. Warm water may ease pain and stress. When should you call for help? Call 911 anytime you think you may need emergency care. For example, call if: 
· You passed out (lost consciousness). · You have a seizure. · You have severe vaginal bleeding. · You have severe pain in your belly or pelvis. · You have had fluid gushing or leaking from your vagina and you know or think the umbilical cord is bulging into your vagina. If this happens, immediately get down on your knees so your rear end (buttocks) is higher than your head. This will decrease the pressure on the cord until help arrives. Call your doctor now or seek immediate medical care if: 
· You have new or worse signs of preeclampsia, such as: 
? Sudden swelling of your face, hands, or feet. ? New vision problems (such as dimness, blurring, or seeing spots). ? A severe headache. · You have any vaginal bleeding. · You have belly pain or cramping. · You have a fever. · You have had regular contractions (with or without pain) for an hour. This means that you have 8 or more within 1 hour or 4 or more in 20 minutes after you change your position and drink fluids. · You have a sudden release of fluid from your vagina. · You have low back pain or pelvic pressure that does not go away. · You notice that your baby has stopped moving or is moving much less than normal. 
Watch closely for changes in your health, and be sure to contact your doctor if you have any problems. Where can you learn more? Go to http://www.gray.com/ Enter B322 in the search box to learn more about \"Early Stage of Labor at Home: Care Instructions. \" Current as of: February 11, 2020               Content Version: 12.5 © 5562-0389 [employer]. Care instructions adapted under license by [employer] (which disclaims liability or warranty for this information). If you have questions about a medical condition or this instruction, always ask your healthcare professional. [address] any warranty or liability for your use of this information.

## 2020-09-08 ENCOUNTER — ROUTINE PRENATAL (OUTPATIENT)
Dept: FAMILY MEDICINE CLINIC | Age: 30
End: 2020-09-08
Payer: MEDICAID

## 2020-09-08 VITALS
DIASTOLIC BLOOD PRESSURE: 67 MMHG | WEIGHT: 174 LBS | BODY MASS INDEX: 30.83 KG/M2 | OXYGEN SATURATION: 97 % | HEART RATE: 74 BPM | TEMPERATURE: 97.4 F | HEIGHT: 63 IN | RESPIRATION RATE: 15 BRPM | SYSTOLIC BLOOD PRESSURE: 106 MMHG

## 2020-09-08 DIAGNOSIS — Z34.93 PRENATAL CARE IN THIRD TRIMESTER: Primary | ICD-10-CM

## 2020-09-08 DIAGNOSIS — Z3A.33 33 WEEKS GESTATION OF PREGNANCY: ICD-10-CM

## 2020-09-08 LAB
BILIRUB UR QL STRIP: NEGATIVE
GLUCOSE UR-MCNC: NEGATIVE MG/DL
KETONES P FAST UR STRIP-MCNC: NEGATIVE MG/DL
PH UR STRIP: 7 [PH] (ref 4.6–8)
PROT UR QL STRIP: NEGATIVE
SP GR UR STRIP: 1.01 (ref 1–1.03)
UA UROBILINOGEN AMB POC: NORMAL (ref 0.2–1)
URINALYSIS CLARITY POC: CLEAR
URINALYSIS COLOR POC: YELLOW
URINE BLOOD POC: NEGATIVE
URINE LEUKOCYTES POC: NORMAL
URINE NITRITES POC: NEGATIVE

## 2020-09-08 PROCEDURE — 0502F SUBSEQUENT PRENATAL CARE: CPT | Performed by: STUDENT IN AN ORGANIZED HEALTH CARE EDUCATION/TRAINING PROGRAM

## 2020-09-08 PROCEDURE — 81003 URINALYSIS AUTO W/O SCOPE: CPT | Performed by: STUDENT IN AN ORGANIZED HEALTH CARE EDUCATION/TRAINING PROGRAM

## 2020-09-08 NOTE — PATIENT INSTRUCTIONS
Weeks 32 to 34 of Your Pregnancy: Care Instructions Your Care Instructions During the last few weeks of your pregnancy, you may have more aches and pains. It's important to rest when you can. Your growing baby is putting more pressure on your bladder. So you may need to urinate more often. Hemorrhoids are also common. These are painful, itchy veins in the rectal area. In the 36th week, most women have a test for group B streptococcus (GBS). GBS is a common bacteria that can live in the vagina and rectum. It can make your baby sick after birth. If you test positive, you will get antibiotics during labor. These will keep your baby from getting the bacteria. You may want to talk with your doctor about banking your baby's umbilical cord blood. This is the blood left in the cord after birth. If you want to save this blood, you must arrange it ahead of time. You can't decide at the last minute. If you haven't already had the Tdap shot during this pregnancy, talk to your doctor about getting it. It will help protect your  against pertussis infection. Follow-up care is a key part of your treatment and safety. Be sure to make and go to all appointments, and call your doctor if you are having problems. It's also a good idea to know your test results and keep a list of the medicines you take. How can you care for yourself at home? Ease hemorrhoids · Get more liquids, fruits, vegetables, and fiber in your diet. This will help keep your stools soft. · Avoid sitting for too long. Lie on your left side several times a day. · Clean yourself with soft, moist toilet paper. Or you can use witch hazel pads or personal hygiene pads. · If you are uncomfortable, try ice packs. Or you can sit in a warm sitz bath. Do these for 20 minutes at a time, as needed. · Use hydrocortisone cream for pain and itching. Two examples are Anusol and Preparation H Hydrocortisone. · Ask your doctor about taking an over-the-counter stool softener. Consider breastfeeding · Experts recommend that women breastfeed for 1 year or longer. · Breast milk may help protect your child from some health problems.  babies are less likely than formula-fed babies to: 
? Get ear infections, colds, diarrhea, and pneumonia. ? Be obese or get diabetes later in life. · Women who breastfeed have less bleeding after the birth. Their uteruses also shrink back faster. · Some women who breastfeed lose weight faster. Making milk burns calories. · Breastfeeding can lower your risk of breast cancer, ovarian cancer, and osteoporosis. Decide about circumcision for boys · As you make this decision, it may help to think about your personal, Yazidi, and family traditions. You get to decide if you will keep your son's penis natural or if he will be circumcised. · If you decide that you would like to have your baby circumcised, talk with your doctor. You can share your concerns about pain. And you can discuss your preferences for anesthesia. Where can you learn more? Go to http://maciej-demarco.info/ Enter C313 in the search box to learn more about \"Weeks 32 to 34 of Your Pregnancy: Care Instructions. \" Current as of: February 11, 2020               Content Version: 12.6 © 7777-4081 [employer]. Care instructions adapted under license by [employer] (which disclaims liability or warranty for this information). If you have questions about a medical condition or this instruction, always ask your healthcare professional. [place] [address] any warranty or liability for your use of this information. Semanas 32 a 34 de deleon embarazo: Instrucciones de cuidado Weeks 32 to 34 of Your Pregnancy: Care Instructions Instrucciones de cuidado [address] [facility] taea catarinor [place] nydia y [place]. Es importante que descanse cuando pueda. Tyler bebé en crecimiento está ejerciendo más presión sobre tyler vejiga. Por eso, delbert vez necesite orinar con más frecuencia. Las hemorroides también son comunes. Estas son venas en la anupama del recto que causan dolor y comezón. En la semana 36, a la mayoría de las [employer] un análisis para detectar el estreptococo del khalida B (GBS, por jayme siglas en inglés). El GBS es ish bacteria común que puede vivir en la vagina y el recto. Podría enfermar a tyler bebé después del nacimiento. Si el [employer] positivo, le darán antibióticos pennie el [place]. Estos prevendrán que el bebé se contagie con la bacteria. Podría convenirle hablar con tyler médico sobre poner en un banco la yuo del cordón umbilical de tyler bebé. Esta es la you que queda en el cordón después del nacimiento. Si desea guardar esta you, debe hacer los trámites necesarios con anticipación. No puede decidirlo en el último minuto. Si todavía no le rivera aplicado la vacuna Tdap (tétanos, difteria y tos [place]) pennie lawanda [facility], hable con tyler médico acerca de aplicársela. Linda Mock a proteger a tyler recién nacido contra la infección por tos ferina. La atención de seguimiento es ish parte clave de tyler tratamiento y seguridad. Asegúrese de hacer y acudir a todas las citas, y llame a tyler médico si está teniendo problemas. También es ish buena idea saber los resultados de jayme exámenes y mantener ish lista de los medicamentos que toni. Cómo puede cuidarse en el hogar? Joechester hemorroides · Aumente en tyler dieta la cantidad de líquidos, frutas, verduras y [place]. [place] ayudará a Cassandra Guardado. · Evite estar sentada pennie demasiado tiempo. Recuéstese sobre el lado ilana varias veces al día. · Límpiese con papel higiénico suave y húmedo.  O puede utilizar compresas de infusión de hamamelis virginiana (\"witch hazel\") o toallas de higiene personal. 
 · Si tiene malestar, pruebe a usar compresas de hielo. O puede hacer johanna de asiento tibios. Hágalos pennie 20 minutos por vez, según lo necesite. · Use ish crema con hidrocortisona para el dolor y la picazón. Kristyn Warren son Anusol y Preparation H Hydrocortisone. · Pregúntele a deleon médico si puede osmin un ablandador de heces de venta johnathon. Considere el amamantamiento · Los especialistas recomiendan que las mujeres amamanten por 1 año o [place]. · [place] ayudar a proteger a deleon hijo contra algunos problemas de ekta. En comparación con los bebés que [address] [facility], los bebés que cristiana leche materna tienen menos probabilidades de: ? Tener infecciones de oído, resfriados, diarrea y neumonía. ? Ser obesos o tener diabetes más adelante en deleon christoph. · [employer] a liat bebés tienen menos sangrado después del [place]. Liat úteros también recobran deleon tamaño normal más rápido. · Algunas mujeres que amamantan bajan de [place] rápido. Elaborar leche quema calorías. · El amamantamiento puede disminuir el riesgo de tener cáncer de seno (mama), cáncer de ovario y osteoporosis. Decida sobre la circuncisión para los niños · Al osmin esta decisión, podría ser de ayuda pensar en liat tradiciones personales, religiosas y familiares. Es deleon decisión si desea conservar el pene de deleon hijo natural o circuncidar a deleon hijo. · Si decide que le gustaría que circuncidaran a deleon bebé, hable con deleon médico. Discuta cualquier inquietud que tenga sobre el dolor. También puede hablar acerca de liat preferencias para la anestesia. Dónde puede encontrar más información en inglés? Ashlie Duran a http://maciej-demarco.info/ Steve Diaz R868 en la búsqueda para aprender más acerca de \"Semanas 28 a 29 de deleon embarazo: Instrucciones de cuidado. \" Revisado: 11 febrero, 2020               Versión del contenido: 12.6 © 0749-9181 [facility], Incorporated. Las instrucciones de cuidado fueron adaptadas bajo licencia por [facility] (which disclaims liability or warranty for this information). Si usted tiene [place] afección médica o sobre estas instrucciones, siempre pregunte a deleon profesional de ekat.  [facility], Incorporated niega toda garantía o responsabilidad por deleon uso de esta información.

## 2020-09-08 NOTE — PROGRESS NOTES
[address] Medicine Residency Attending Addendum:  Dr. Christine Perez MD,  the patient and I were not physically present during this encounter. The resident and I are concurrently monitoring the patient care through appropriate telecommunication technology. I discussed the findings, assessment and plan with the resident and agree with the resident's findings and plan as documented in the resident's note. Sabine Schrader MD      Will keep a close eye on her weight gain as she has only gained 10lbs compared to her pre-pregnancy weight. Compared to her weight at the first prenatal visit, however, she has gained 21lbs. No concerns on anatomy scan.

## 2020-09-08 NOTE — PROGRESS NOTES
Return OB Visit       Subjective:   Danis Getting 27 y.o. Alisia Coombs  DENISE: 10/27/2020, by Last Menstrual Period  GA:  33w0d. LOF: NO  Vaginal bleeding:NO  Fetal movement (after 20 weeks): Yes  Contractions:No    Pt reports mild headaches a few times a week, but better than before and doesn't feel are bad enough to take tylenol. Denies any dizziness, vision changes, chest pain, SOB, cough, or other complaints at this time. Medications- reviewed:   Current Outpatient Medications   Medication Sig    riboflavin, vitamin B2, (Vitamin B-2) 100 mg tablet Take 100 mg by mouth two (2) times a day.  pyridoxine, vitamin B6, (VITAMIN B-6) 25 mg tablet Take 1 Tab by mouth daily.  prenatal [id number] fum/folic (prenatal vitamin) 27 mg iron- 800 mcg tab tablet Take 1 Tab by mouth daily. No current facility-administered medications for this visit. 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 No pertinent surgical history.   Immunizations- reviewed:   Immunization History   Administered Date(s) Administered    TDAP Vaccine 10/31/2012    Tdap 07/28/2020     Tdap given, pt declines flu vaccine at this time, says \"maybe next time\"      Objective:     Visit Vitals  /67   Pulse 74   Temp 97.4 °F (36.3 °C) (Temporal)   Resp 15   Ht 5' 3\" (1.6 m)   Wt 174 lb (78.9 kg)   LMP 01/21/2020 (Approximate)   SpO2 97%   BMI 30.82 kg/m²       Physical Exam:  See OB episode     Labs  Recent Results (from the past 12 hour(s))   AMB POC URINALYSIS DIP STICK AUTO W/O MICRO    Collection Time: 09/08/20 10:04 AM   Result Value Ref Range    Color (UA POC) Yellow     Clarity (UA POC) Clear     Glucose (UA POC) Negative Negative    Bilirubin (UA POC) Negative Negative    Ketones (UA POC) Negative Negative    Specific gravity (UA POC) 1.010 1.001 - 1.035 Blood (UA POC) Negative Negative    pH (UA POC) 7.0 4.6 - 8.0    Protein (UA POC) Negative Negative    Urobilinogen (UA POC) 0.2 mg/dL 0.2 - 1    Nitrites (UA POC) Negative Negative    Leukocyte esterase (UA POC) Trace Negative         Assessment         ICD-10-CM ICD-9-CM    1. Prenatal care in third trimester  Z34.93 V22.1 AMB POC URINALYSIS DIP STICK AUTO W/O MICRO       Plan   27 y.o.  33w0d DENISE 10/27/2020, by Last Menstrual Period here for return OB visit       · SIUP - PNL: A, Rh pos, Hgb 13.3, GC/chlam neg, VZV/rub imm, RPR/HIV NR, Hep B neg, Pap nml, dating by LMP consistent with US at 10w 6d  - Pt declined genetic screening  - 1 hour GTT to be done 9/10/20, pt says she has somewhere to be soon today and will come back then (test has failed on 2 prior occasions due to lab draw errors)  - Tdap completed, Flu shot declined today, will need to offer again at next clinic visit   - Ultrasound for fetal Anomalies done on 6/10/20 which showed normal anatomy   - Follow up in 2 weeks for virtual prenatal visit by phone  - Anticipating          Orders Placed This Encounter    AMB POC URINALYSIS DIP STICK AUTO W/O MICRO     Labor precautions discussed, including: Regular painful contractions, lasting for greater than one hour, taking your breath away; any vaginal bleeding; any leakage of fluid; or absent or decreased fetal movement. Call M.D. on call if any of these symptoms or signs occur. I have discussed the diagnosis with the patient and the intended plan as seen in the above orders. The patient has received an after-visit summary and questions were answered concerning future plans. I have discussed medication side effects and warnings with the patient as well. Informed pt to return to the office or go to the ER if she experiences vaginal bleeding, vaginal discharge, leaking of fluid, pelvic cramping.     Pt seen and discussed with Dr. Alexis Millan (attending physician)    Wily Busby MD  [facility] Medicine Resident

## 2020-09-29 ENCOUNTER — ROUTINE PRENATAL (OUTPATIENT)
Dept: FAMILY MEDICINE CLINIC | Age: 30
End: 2020-09-29
Payer: MEDICAID

## 2020-09-29 VITALS
TEMPERATURE: 98.1 F | WEIGHT: 177.2 LBS | RESPIRATION RATE: 20 BRPM | SYSTOLIC BLOOD PRESSURE: 112 MMHG | OXYGEN SATURATION: 95 % | BODY MASS INDEX: 31.39 KG/M2 | HEART RATE: 102 BPM | DIASTOLIC BLOOD PRESSURE: 73 MMHG

## 2020-09-29 DIAGNOSIS — Z34.93 PRENATAL CARE IN THIRD TRIMESTER: ICD-10-CM

## 2020-09-29 DIAGNOSIS — Z23 ENCOUNTER FOR IMMUNIZATION: Primary | ICD-10-CM

## 2020-09-29 DIAGNOSIS — Z3A.27 27 WEEKS GESTATION OF PREGNANCY: ICD-10-CM

## 2020-09-29 LAB
BILIRUB UR QL STRIP: NORMAL
GLUCOSE 1H P 100 G GLC PO SERPL-MCNC: 134 MG/DL (ref 65–140)
GLUCOSE UR-MCNC: NEGATIVE MG/DL
GRBS, EXTERNAL: NEGATIVE
KETONES P FAST UR STRIP-MCNC: NORMAL MG/DL
PH UR STRIP: 6 [PH] (ref 4.6–8)
PROT UR QL STRIP: NORMAL
SP GR UR STRIP: 1.03 (ref 1–1.03)
UA UROBILINOGEN AMB POC: NORMAL (ref 0.2–1)
URINALYSIS CLARITY POC: CLEAR
URINALYSIS COLOR POC: YELLOW
URINE BLOOD POC: NEGATIVE
URINE LEUKOCYTES POC: NORMAL
URINE NITRITES POC: NEGATIVE

## 2020-09-29 PROCEDURE — 90471 IMMUNIZATION ADMIN: CPT | Performed by: STUDENT IN AN ORGANIZED HEALTH CARE EDUCATION/TRAINING PROGRAM

## 2020-09-29 PROCEDURE — 90686 IIV4 VACC NO PRSV 0.5 ML IM: CPT | Performed by: STUDENT IN AN ORGANIZED HEALTH CARE EDUCATION/TRAINING PROGRAM

## 2020-09-29 PROCEDURE — 81003 URINALYSIS AUTO W/O SCOPE: CPT | Performed by: STUDENT IN AN ORGANIZED HEALTH CARE EDUCATION/TRAINING PROGRAM

## 2020-09-29 PROCEDURE — 0502F SUBSEQUENT PRENATAL CARE: CPT | Performed by: STUDENT IN AN ORGANIZED HEALTH CARE EDUCATION/TRAINING PROGRAM

## 2020-09-29 NOTE — PROGRESS NOTES
Return OB Visit     OB History    Para Term  AB Living   4 2 2 0 1 2   SAB TAB Ectopic Molar Multiple Live Births   1 0 0   1 2      # Outcome Date GA Lbr Tevin/2nd Weight Sex Delivery Anes PTL Lv   4 Current            3 Term 16 39w4d 09:44 / 00:34 7 lb 4.1 oz (3.29 kg) M Vag-Spont EPIDURAL AN N CASIE   2A SAB 01/15/16           2B SAB 01/15/16           1 Term 10/29/12   7 lb 11 oz (3.487 kg) F Vag-Spont None N CASIE      Obstetric Comments   Menarche:  6. LMP: 3/15/16. # of Children:  1. Age at Delivery of First Child:  25.   Hysterectomy/oophorectomy:  NO/NO. Breast Bx:  No.  Hx of Breast Feeding:  No.  BCP:  No. Hormone therapy:  No.     Subjective:   Caridad Brittle is a 27 y.o.  at 36w0d by LMP. Estimated Date of Delivery: 10/27/20    Concerns today: Intermittent lower abdominal pressure. Also the pt was mopping earlier today when she slipped and fell. She landed first on her knee and then her right side. She states she did not hit her front. Has continued to have normal fetal movement since the incident. LOF: No  Vaginal bleeding: No  Fetal movement (after 20 weeks): Yes   Contractions: No  Dysuria: No  Headaches, blurred vision, RUQ pain: No  Taking prenatal vitamins: Yes    Allergies   No Known Allergies  Medications:   Current Outpatient Medications   Medication Sig    prenatal [id number] fum/folic (prenatal vitamin) 27 mg iron- 800 mcg tab tablet Take 1 Tab by mouth daily.  riboflavin, vitamin B2, (Vitamin B-2) 100 mg tablet Take 100 mg by mouth two (2) times a day.  pyridoxine, vitamin B6, (VITAMIN B-6) 25 mg tablet Take 1 Tab by mouth daily. No current facility-administered medications for this visit. Past Medical History:  Past Medical History:   Diagnosis Date    Anemia 2012    Depression 2013    Vanishing twin syndrome 2016     Past Surgical History:   No past surgical history on file.   Social History:  Social History     Tobacco Use  Smoking status: Never Smoker    Smokeless tobacco: Never Used   Substance Use Topics    Alcohol use: No    Drug use: No     Immunizations:   Immunization History   Administered Date(s) Administered    Influenza Vaccine (Quad) PF 2020    TDAP Vaccine 10/31/2012    Tdap 2020       Objective:   Vital Signs  Visit Vitals  /73 (BP 1 Location: Right arm, BP Patient Position: Sitting)   Pulse (!) 102   Temp 98.1 °F (36.7 °C) (Temporal)   Resp 20   Wt 177 lb 3.2 oz (80.4 kg)   LMP 2020 (Approximate)   SpO2 95%   BMI 31.39 kg/m²       Physical Exam  GENERAL APPEARANCE: alert, well appearing, in no apparent distress  ABDOMEN: gravid, fundal height 34cm, FHT present at 130s with accels. SVE: 0/0/-3  PSYCH: normal mood and affect          Labs  Recent Results (from the past 12 hour(s))   AMB POC URINALYSIS DIP STICK AUTO W/O MICRO    Collection Time: 20  4:35 PM   Result Value Ref Range    Color (UA POC) Yellow     Clarity (UA POC) Clear     Glucose (UA POC) Negative Negative    Bilirubin (UA POC) 1+ Negative    Ketones (UA POC) 2+ Negative    Specific gravity (UA POC) 1.030 1.001 - 1.035    Blood (UA POC) Negative Negative    pH (UA POC) 6 4.6 - 8.0    Protein (UA POC) 1+ Negative    Urobilinogen (UA POC) 1 mg/dL 0.2 - 1    Nitrites (UA POC) Negative Negative    Leukocyte esterase (UA POC) Trace Negative       Assessment   Man Chavez is a 27 y.o.  at 36w0d here for a return OB visit. Estimated Date of Delivery: 10/27/20   Plan     SIUP - PNL: A, Rh pos, Hgb 13.3, GC/chlam neg, VZV/rub imm, RPR/HIV NR, Hep B neg, Pap nml, dating by LMP consistent with US at 10w 6d. - Pt declined genetic screening  - 20 wk scan with normal anatomy  - Slow weight gain this pregnancy, component of n/v. Now gained 25lbs this pregnancy and measurements only 2cm less than dates so more reassuring.   - 1 hour GTT performed today. Two prior attempts were unsuccessful due to lab errors.  This was communicated to lab today. - Flu vaccine today  - Confirmed cephalic today  - GBS culture sent today  - SVE 0/0/-3  - UA +1 protein. Urine very concentrated. Discussed increasing fluid intake. Bps remain wnl and no PreE symptoms. If proteinuria persists next visit will get PreE labs. - Anticipate  at [facility]. FallThad Mook to R side today. No abdominal impact. Discussed continued monitoring of fetal movement. FHT by doppler appropriate today and reassuring. Given return and ED precautions. Future Appointments   Date Time Provider Eun Gold   10/6/2020 10:00 AM Ten Zarco MD [facility] BS AMB       Labor precautions discussed, including: Regular painful contractions, lasting for greater than one hour, taking your breath away; any vaginal bleeding; any leakage of fluid; or absent or decreased fetal movement. Call M.D. on call if any of these symptoms or signs occur. I have discussed the diagnosis with the patient and the intended plan as seen in the above orders. The patient has received an after-visit summary and questions were answered concerning future plans. I have discussed medication side effects and warnings with the patient as well. Informed pt to return to the office or go to the ER if she experiences vaginal bleeding, vaginal discharge, leaking of fluid, pelvic cramping.     Patient discussed with Dr. Tony Chris (Attending Physician)    Felipe Hill MD  Family Medicine Resident

## 2020-09-29 NOTE — PROGRESS NOTES
1. Have you been to the ER, urgent care clinic since your last visit? Hospitalized since your last visit? No    2. Have you seen or consulted any other health care providers outside of the [address] [place] [address] since your last visit? Include any pap smears or colon screening. No    Reviewed record in preparation for visit and have necessary documentation  Goals that were addressed and/or need to be completed during or after this appointment include     Health Maintenance Due   Topic Date Due    Flu Vaccine (1) 09/01/2020       Patient is accompanied by self I have received verbal consent from Laura Lopez to discuss any/all medical information while they are present in the room.

## 2020-09-30 NOTE — PROGRESS NOTES
Pt is a 27 y.o.  at 36w0d by LMP c/w 1st trimester US for routine prenatal care. Rh: Positive  GBS: Obtained today  Vaginal bleeding: NO  LOF: NO  Fetal movement: Normal  FHT's: 140's  Delivery plan:  Anticipate

## 2020-10-02 LAB
BACTERIA SPEC CULT: NORMAL
SERVICE CMNT-IMP: NORMAL

## 2020-10-06 ENCOUNTER — ROUTINE PRENATAL (OUTPATIENT)
Dept: FAMILY MEDICINE CLINIC | Age: 30
End: 2020-10-06
Payer: MEDICAID

## 2020-10-06 VITALS
DIASTOLIC BLOOD PRESSURE: 81 MMHG | BODY MASS INDEX: 31.39 KG/M2 | OXYGEN SATURATION: 100 % | HEART RATE: 81 BPM | WEIGHT: 177.2 LBS | RESPIRATION RATE: 22 BRPM | TEMPERATURE: 97.1 F | SYSTOLIC BLOOD PRESSURE: 105 MMHG

## 2020-10-06 DIAGNOSIS — Z34.93 PRENATAL CARE IN THIRD TRIMESTER: Primary | ICD-10-CM

## 2020-10-06 PROCEDURE — 0502F SUBSEQUENT PRENATAL CARE: CPT | Performed by: STUDENT IN AN ORGANIZED HEALTH CARE EDUCATION/TRAINING PROGRAM

## 2020-10-06 PROCEDURE — 81003 URINALYSIS AUTO W/O SCOPE: CPT | Performed by: STUDENT IN AN ORGANIZED HEALTH CARE EDUCATION/TRAINING PROGRAM

## 2020-10-06 NOTE — PROGRESS NOTES
Pt is a 27 y.o.  at 37w0d by LMP c/w 1st trimester US for routine prenatal care. Rh: Positive  GBS: Negative  Vaginal bleeding: NO  LOF: NO  Fetal movement: Normal  FHT's: 130's  Delivery plan: Anticipate     Will get COVID swab at next visit.

## 2020-10-06 NOTE — PROGRESS NOTES
1. Have you been to the ER, urgent care clinic since your last visit? Hospitalized since your last visit? No    2. Have you seen or consulted any other health care providers outside of the [address] [place] [address] since your last visit? Include any pap smears or colon screening. No    Reviewed record in preparation for visit and have necessary documentation  Goals that were addressed and/or need to be completed during or after this appointment include     There are no preventive care reminders to display for this patient. Patient is accompanied by self I have received verbal consent from Jose Cruz Jerez to discuss any/all medical information while they are present in the room.

## 2020-10-06 NOTE — PROGRESS NOTES
Return OB Visit     OB History    Para Term  AB Living   4 2 2 0 1 2   SAB TAB Ectopic Molar Multiple Live Births   1 0 0   1 2      # Outcome Date GA Lbr Tevin/2nd Weight Sex Delivery Anes PTL Lv   4 Current            3 Term 16 39w4d 09:44 / 00:34 7 lb 4.1 oz (3.29 kg) M Vag-Spont EPIDURAL AN N CASIE   2A SAB 01/15/16           2B SAB 01/15/16           1 Term 10/29/12   7 lb 11 oz (3.487 kg) F Vag-Spont None N CASIE      Obstetric Comments   Menarche:  6. LMP: 3/15/16. # of Children:  1. Age at Delivery of First Child:  25.   Hysterectomy/oophorectomy:  NO/NO. Breast Bx:  No.  Hx of Breast Feeding:  No.  BCP:  No. Hormone therapy:  No.     Subjective:   Rena Mayorga is a 27 y.o.  at 37w0d by LMP. Estimated Date of Delivery: 10/27/20    Concerns today: No major concerns. Infrequent lower abdominal cramping every few days. Has increased fluid intake. Baby kicking in diaphragm. LOF: no  Vaginal bleeding: no  Fetal movement (after 20 weeks): yes   Contractions: Miami Patel  Dysuria: no  Headaches, blurred vision, RUQ pain: no  Taking prenatal vitamins: yes      Allergies   No Known Allergies  Medications:   Current Outpatient Medications   Medication Sig    riboflavin, vitamin B2, (Vitamin B-2) 100 mg tablet Take 100 mg by mouth two (2) times a day.  pyridoxine, vitamin B6, (VITAMIN B-6) 25 mg tablet Take 1 Tab by mouth daily.  prenatal [id number] fum/folic (prenatal vitamin) 27 mg iron- 800 mcg tab tablet Take 1 Tab by mouth daily. No current facility-administered medications for this visit. Past Medical History:  Past Medical History:   Diagnosis Date    Anemia 2012    Depression 2013    Vanishing twin syndrome 2016     Past Surgical History:   No past surgical history on file.   Social History:  Social History     Tobacco Use    Smoking status: Never Smoker    Smokeless tobacco: Never Used   Substance Use Topics    Alcohol use: No    Drug use: No     Immunizations:   Immunization History   Administered Date(s) Administered    Influenza Vaccine [employer]) PF (>6 Mo Flulaval, Fluarix, and >3 Yrs Afluria, Fluzone 68809) 2020    TDAP Vaccine 10/31/2012    Tdap 2020       Objective:   Vital Signs  Visit Vitals  /81 (BP 1 Location: Right arm, BP Patient Position: Sitting)   Pulse 81   Temp 97.1 °F (36.2 °C) (Temporal)   Resp 22   Wt 177 lb 3.2 oz (80.4 kg)   LMP 2020 (Approximate)   SpO2 100%   BMI 31.39 kg/m²       Physical Exam  GENERAL APPEARANCE: alert, well appearing, in no apparent distress  ABDOMEN: gravid, fundal height 36cm, FHT present at 135bpm. SVE: 0/0/-3  PSYCH: normal mood and affect    Labs  No results found for this or any previous visit (from the past 12 hour(s)). Assessment   Keyla Martinez is a 27 y.o. T[place] Box at 37w0d here for a return OB visit. Estimated Date of Delivery: 10/27/20   Plan     SIUP - PNL: A, Rh pos, Hgb 13.3, GC/chlam neg, VZV/rub imm, RPR/HIV NR, Hep B neg, Pap nml, dating by LMP consistent with US at 10w 6d. 1hr GTT wnl. S/p Flu and Tdap. GBS negative  - Next week will need SCREENING COVID TEST for L&D. Will also check cervix, fetal heart tonnes and fundal height. - Pt declined genetic screening  - 20 wk scan with normal anatomy  - Slow weight gain this pregnancy, component of n/v. Now gained 25lbs this pregnancy and measuring fundal height appropriately  - Cephalic  by ultrasound, see note for image  - Last UA with +1 protein. No protein this week. Encouraged continued fluid intake   - Anticipate  at [facility]. Future Appointments   Date Time Provider Eun Gold   10/12/2020 10:00 AM Corby Cook MD [facility] BS AMB       Labor precautions discussed, including: Regular painful contractions, lasting for greater than one hour, taking your breath away; any vaginal bleeding; any leakage of fluid; or absent or decreased fetal movement.  Call M.D. on call if any of these symptoms or signs occur. I have discussed the diagnosis with the patient and the intended plan as seen in the above orders. The patient has received an after-visit summary and questions were answered concerning future plans. I have discussed medication side effects and warnings with the patient as well. Informed pt to return to the office or go to the ER if she experiences vaginal bleeding, vaginal discharge, leaking of fluid, pelvic cramping.     Patient discussed with Dr. Patricia Chavez (Attending Physician)    John Cisneros MD  Family Medicine Resident

## 2020-10-12 ENCOUNTER — ROUTINE PRENATAL (OUTPATIENT)
Dept: FAMILY MEDICINE CLINIC | Age: 30
End: 2020-10-12
Payer: MEDICAID

## 2020-10-12 VITALS
OXYGEN SATURATION: 97 % | RESPIRATION RATE: 20 BRPM | SYSTOLIC BLOOD PRESSURE: 111 MMHG | TEMPERATURE: 97 F | HEART RATE: 89 BPM | DIASTOLIC BLOOD PRESSURE: 71 MMHG | WEIGHT: 181 LBS | BODY MASS INDEX: 32.06 KG/M2

## 2020-10-12 DIAGNOSIS — Z3A.37 PREGNANCY WITH 37 WEEKS COMPLETED GESTATION: Primary | ICD-10-CM

## 2020-10-12 DIAGNOSIS — Z34.93 PRENATAL CARE IN THIRD TRIMESTER: ICD-10-CM

## 2020-10-12 DIAGNOSIS — R82.90 ABNORMAL URINALYSIS: ICD-10-CM

## 2020-10-12 LAB
BILIRUB UR QL STRIP: NORMAL
GLUCOSE UR-MCNC: NEGATIVE MG/DL
KETONES P FAST UR STRIP-MCNC: NORMAL MG/DL
PH UR STRIP: 6 [PH] (ref 4.6–8)
PROT UR QL STRIP: NORMAL
SP GR UR STRIP: 1.03 (ref 1–1.03)
UA UROBILINOGEN AMB POC: NORMAL (ref 0.2–1)
URINALYSIS CLARITY POC: NORMAL
URINALYSIS COLOR POC: YELLOW
URINE BLOOD POC: NORMAL
URINE LEUKOCYTES POC: NORMAL
URINE NITRITES POC: NEGATIVE

## 2020-10-12 PROCEDURE — 0502F SUBSEQUENT PRENATAL CARE: CPT | Performed by: FAMILY MEDICINE

## 2020-10-12 PROCEDURE — 81003 URINALYSIS AUTO W/O SCOPE: CPT | Performed by: FAMILY MEDICINE

## 2020-10-12 NOTE — PROGRESS NOTES
RETURN OB VISIT SUMMARY    Subjective:    27 y.o.  Ansley Castro   37w6d. States she does have mild nausea/vomiting (Once daily), Pelvic pressure. Occasional humphrey guillermo. and does not have abdominal pain  , chest pain, fever, headache , right upper quadrant pain  , shortness of breath, swelling, vaginal bleeding , vaginal leaking of fluid , visual disturbances and blood in urine, burning with urination and dysuria. Baby licking and very active. She is taking PNV and she is tolerating diet and is hydrating well. Drinks 2 Large water bottles daily at least.    Objective:    Physical Exam:  Visit Vitals  /71 (BP 1 Location: Right arm, BP Patient Position: Sitting)   Pulse 89   Temp 97 °F (36.1 °C) (Temporal)   Resp 20   Wt 181 lb (82.1 kg)   LMP 01/21/2020 (Approximate)   SpO2 97%   BMI 32.06 kg/m²     Patient without distress.   Heart: Regular rate and rhythm, S1S2 present or without murmur or extra heart sounds  Lung: clear to auscultation throughout lung fields, no wheezes, no rales, no rhonchi and normal respiratory effort  Back: costovertebral angle tenderness absent  Abdomen: soft, nontender, protuberant  Fundus: soft and non tender   Cervix: Closed thick and high  Lower Extremities:  - Edema No   - No evidence of DVT seen on physical exam.    Lab Results   Component Value Date/Time    HGB 13.3 04/07/2020 11:00 AM    HCT 42.3 04/07/2020 11:00 AM    PLATELET 924 10/62/5253 11:00 AM    ABO Group A 05/17/2016 04:51 PM    Antibody screen NEG 04/07/2020 11:12 AM    Gestational Diabetes Screen 75 09/26/2016 10:51 AM    Chlamydia amplified NEGATIVE  05/05/2016 03:45 PM    N. gonorrhea, amplified NEGATIVE  05/05/2016 03:45 PM    Rubella, IgG Qt. 214.40 04/07/2020 11:00 AM    Hep B surface Ag screen Negative 05/17/2016 04:51 PM    Hgb, External 10.9 03/22/2012    Hct, External 33.2 03/22/2012    Antibody screen, External negative 09/26/2016     Immunization History   Administered Date(s) Administered    Influenza Vaccine Complix) PF (>6 Mo Flulaval, Fluarix, and >3 Leveda Chinchilla 65846) 2020    TDAP Vaccine 10/31/2012    Tdap 2020      Flu Shot Given  Tdap 28-32 wks  Given    Assessment/Plan:      ICD-10-CM ICD-9-CM    1. Pregnancy with 37 weeks completed gestation  Z3A.37 V22.2    2. Prenatal care in third trimester  Z34.93 V22.1 AMB POC URINALYSIS DIP STICK AUTO W/O MICRO      NOVEL CORONAVIRUS (COVID-19)      NOVEL CORONAVIRUS (COVID-19)   3. Abnormal urinalysis  R82.90 791.9 CULTURE, URINE      CULTURE, URINE     Diagnoses and all orders for this visit:    1. Pregnancy with 37 weeks completed gestation: SIUP - PNL: A, Rh pos, Hgb 13.3, GC/chlam neg, VZV/rub imm, RPR/HIV NR, Hep B neg, Pap nml, dating by LMP consistent with US at 10w 6d. 1hr GTT wnl. S/p Flu and Tdap. GBS negative. Follow up in 1 week. Coronavirus pre  testing completed today. 2. Prenatal care in third trimester  -     AMB POC URINALYSIS DIP STICK AUTO W/O MICRO    3. Abnormal urinalysis  -     CULTURE, URINE; Future      Follow-up and Dispositions    · Return in about 1 week (around 10/19/2020) for Prenatal care.

## 2020-10-12 NOTE — PROGRESS NOTES
1. Have you been to the ER, urgent care clinic since your last visit? Hospitalized since your last visit? No    2. Have you seen or consulted any other health care providers outside of [facility] since your [facility] visit? Include any pap smears or colon screening. No    Reviewed record in preparation for visit and have necessary documentation  Goals that were addressed and/or need to be completed during or after this appointment include     There are no preventive care reminders to display for this patient. Patient is accompanied by self I have received verbal consent from Shiela Esparza to discuss any/all medical information while they are present in the room.

## 2020-10-14 ENCOUNTER — TELEPHONE (OUTPATIENT)
Dept: FAMILY MEDICINE CLINIC | Age: 30
End: 2020-10-14

## 2020-10-14 LAB
BACTERIA SPEC CULT: NORMAL
CC UR VC: NORMAL
SARS-COV-2, COV2NT: NOT DETECTED
SERVICE CMNT-IMP: NORMAL

## 2020-10-14 NOTE — TELEPHONE ENCOUNTER
Patient called per Dr. Jami Reddy:  we are still waiting on her [facility] test, but she does not have any UTI. Patient verbalized understanding and appreciative.

## 2020-10-20 ENCOUNTER — ROUTINE PRENATAL (OUTPATIENT)
Dept: FAMILY MEDICINE CLINIC | Age: 30
End: 2020-10-20
Payer: MEDICAID

## 2020-10-20 VITALS
TEMPERATURE: 97.3 F | WEIGHT: 183 LBS | OXYGEN SATURATION: 97 % | RESPIRATION RATE: 16 BRPM | SYSTOLIC BLOOD PRESSURE: 121 MMHG | DIASTOLIC BLOOD PRESSURE: 77 MMHG | BODY MASS INDEX: 32.43 KG/M2 | HEART RATE: 104 BPM | HEIGHT: 63 IN

## 2020-10-20 DIAGNOSIS — Z34.93 PRENATAL CARE IN THIRD TRIMESTER: Primary | ICD-10-CM

## 2020-10-20 LAB
BILIRUB UR QL STRIP: NEGATIVE
GLUCOSE UR-MCNC: NEGATIVE MG/DL
KETONES P FAST UR STRIP-MCNC: NORMAL MG/DL
PH UR STRIP: 6 [PH] (ref 4.6–8)
PROT UR QL STRIP: NEGATIVE
SP GR UR STRIP: 1.02 (ref 1–1.03)
UA UROBILINOGEN AMB POC: NORMAL (ref 0.2–1)
URINALYSIS CLARITY POC: CLEAR
URINALYSIS COLOR POC: YELLOW
URINE BLOOD POC: NEGATIVE
URINE LEUKOCYTES POC: NEGATIVE
URINE NITRITES POC: NEGATIVE

## 2020-10-20 PROCEDURE — 81003 URINALYSIS AUTO W/O SCOPE: CPT | Performed by: STUDENT IN AN ORGANIZED HEALTH CARE EDUCATION/TRAINING PROGRAM

## 2020-10-20 PROCEDURE — 0502F SUBSEQUENT PRENATAL CARE: CPT | Performed by: STUDENT IN AN ORGANIZED HEALTH CARE EDUCATION/TRAINING PROGRAM

## 2020-10-20 NOTE — PROGRESS NOTES
Pt is a 27 y.o.  at 39w0d by LMP c/w 1st trimester US for routine prenatal care. Rh: Positive  GBS: Negative  Vaginal bleeding: NO  LOF: NO  Fetal movement: Normal  FHT's: 130's  Delivery plan: Anticipate     COVID screening negative last week. Will schedule induction at next visit if not delivered.

## 2020-10-20 NOTE — PROGRESS NOTES
1. Have you been to the ER, urgent care clinic, or been hospitalized since your last visit? No     2. Have you seen or consulted any other health care providers outside of the [address] [place] [address] since your last visit? No     Reviewed record in preparation for visit and have necessary documentation  Goals that were addressed and/or need to be completed during or after this appointment include   There are no preventive care reminders to display for this patient. I have received verbal consent from Susie Edwards to discuss any/all medical information while others present in the room.

## 2020-10-20 NOTE — PROGRESS NOTES
Return OB Visit     OB History    Para Term  AB Living   4 2 2 0 1 2   SAB TAB Ectopic Molar Multiple Live Births   1 0 0   1 2      # Outcome Date GA Lbr Tevin/2nd Weight Sex Delivery Anes PTL Lv   4 Current            3 Term 16 39w4d 09:44 / 00:34 7 lb 4.1 oz (3.29 kg) M Vag-Spont EPIDURAL AN N CASIE   2A SAB 01/15/16           2B SAB 01/15/16           1 Term 10/29/12   7 lb 11 oz (3.487 kg) F Vag-Spont None N CASIE      Obstetric Comments   Menarche:  6. LMP: 3/15/16. # of Children:  1. Age at Delivery of First Child:  25.   Hysterectomy/oophorectomy:  NO/NO. Breast Bx:  No.  Hx of Breast Feeding:  No.  BCP:  No. Hormone therapy:  No.     Subjective:   Sonu Barkley is a 27 y.o.  at 39w0d by LMP Estimated Date of Delivery: 10/27/20    Concerns today: None    LOF: no  Vaginal bleeding: no  Fetal movement (after 20 weeks): yes   Contractions: Josh Patel  Dysuria: no  Headaches, blurred vision, RUQ pain: no  Taking prenatal vitamins: yes      Allergies   No Known Allergies  Medications:   Current Outpatient Medications   Medication Sig    riboflavin, vitamin B2, (Vitamin B-2) 100 mg tablet Take 100 mg by mouth two (2) times a day.  pyridoxine, vitamin B6, (VITAMIN B-6) 25 mg tablet Take 1 Tab by mouth daily.  prenatal [id number] fum/folic (prenatal vitamin) 27 mg iron- 800 mcg tab tablet Take 1 Tab by mouth daily. No current facility-administered medications for this visit. Past Medical History:  Past Medical History:   Diagnosis Date    Anemia 2012    Depression 2013    Vanishing twin syndrome 2016     Past Surgical History:   No past surgical history on file.   Social History:  Social History     Tobacco Use    Smoking status: Never Smoker    Smokeless tobacco: Never Used   Substance Use Topics    Alcohol use: No    Drug use: No     Immunizations:   Immunization History   Administered Date(s) Administered    Influenza Vaccine (Quad) PF (>6 Mo Flulaval, Fluarix, and >3 Yrs Afluria, Fluzone 33847) 2020    TDAP Vaccine 10/31/2012    Tdap 2020       Objective:   Vital Signs  Visit Vitals  /77   Pulse (!) 104   Temp 97.3 °F (36.3 °C) (Temporal)   Resp 16   Ht 5' 3\" (1.6 m)   Wt 183 lb (83 kg)   LMP 2020 (Approximate)   SpO2 97%   BMI 32.42 kg/m²       Physical Exam  GENERAL APPEARANCE: alert, well appearing, in no apparent distress  ABDOMEN: gravid, fundal height 39cm, FHT present at 105sbpm. SVE: 0/0/-3  PSYCH: normal mood and affect    Labs  UA- No protein, glucose or LE    Assessment   Ben Huber is a 27 y.o.  at 39w0d here for a return OB visit. Estimated Date of Delivery: 10/27/20   Plan     SIUP - PNL: A, Rh pos, Hgb 13.3, GC/chlam neg, VZV/rub imm, RPR/HIV NR, Hep B neg, Pap nml, dating by LMP consistent with US at 10w 6d. 1hr GTT wnl. S/p Flu and Tdap. GBS negative. COVID screening 10/12 negative. - Pt declined genetic screening  - 20 wk scan with normal anatomy  - Slow weight gain this pregnancy, component of n/v. Now gained >25lbs this pregnancy and measuring fundal height appropriately  - Cephalic  by ultrasound, see note for image  - Anticipate  at [facility]. · Other  · Continuity Provider: Dr. Wilhelmena Nageotte  · Pain mgmt. in labor: Possible epidural, will attempt without first  · Feeding: Breastfeeding  · Circ: N/A  · Social: Family coming in from [place],  and two other kids at home. Future Appointments   Date Time Provider Eun Gold   10/27/2020  3:00 PM Kurt Mason, DO [facility] BS AMB       Labor precautions discussed, including: Regular painful contractions, lasting for greater than one hour, taking your breath away; any vaginal bleeding; any leakage of fluid; or absent or decreased fetal movement. Call M.D. on call if any of these symptoms or signs occur. I have discussed the diagnosis with the patient and the intended plan as seen in the above orders.   The patient has received an after-visit summary and questions were answered concerning future plans. I have discussed medication side effects and warnings with the patient as well. Informed pt to return to the office or go to the ER if she experiences vaginal bleeding, vaginal discharge, leaking of fluid, pelvic cramping.     Patient discussed with Dr. Mehnaz Arnold (Attending Physician)    Cindy Mishra MD  Family Medicine Resident

## 2020-10-21 ENCOUNTER — HOSPITAL ENCOUNTER (EMERGENCY)
Age: 30
Discharge: HOME | End: 2020-10-21
Attending: FAMILY MEDICINE | Admitting: OBSTETRICS & GYNECOLOGY

## 2020-10-21 VITALS
DIASTOLIC BLOOD PRESSURE: 69 MMHG | RESPIRATION RATE: 16 BRPM | SYSTOLIC BLOOD PRESSURE: 113 MMHG | HEART RATE: 88 BPM | TEMPERATURE: 98.9 F

## 2020-10-21 LAB
A1 MICROGLOB PLACENTAL VAG QL: NEGATIVE
CONTROL LINE PRESENT?: NORMAL
EXPIRATION DATE: NORMAL
INTERNAL NEGATIVE CONTROL: NORMAL
KIT LOT NO.: NORMAL

## 2020-10-21 PROCEDURE — 99283 EMERGENCY DEPT VISIT LOW MDM: CPT

## 2020-10-21 PROCEDURE — 59025 FETAL NON-STRESS TEST: CPT

## 2020-10-21 PROCEDURE — 84112 EVAL AMNIOTIC FLUID PROTEIN: CPT | Performed by: FAMILY MEDICINE

## 2020-10-21 PROCEDURE — 75810000275 HC EMERGENCY DEPT VISIT NO LEVEL OF CARE

## 2020-10-22 NOTE — DISCHARGE INSTRUCTIONS
Patient Education   Patient Education        Semana 44 de deleon embarazo: Instrucciones de cuidado  Week 44 of Your Pregnancy: Care Instructions  Instrucciones de cuidado     Dmitry estas últimas semanas, podría sentirse ansiosa por clyde a deleon nuevo bebé. Los bebés recién nacidos suelen tener un aspecto distinto al que ve en fotografías o películas. Inmediatamente después del nacimiento, es posible que la ehsan tenga ish forma extraña. Los ojos podrían estar inflamados. Y los genitales delbert vez estén hinchados. Además, podrían tener la piel muy seca o marcas rojizas en los párpados, la nariz o el ashley. De todos modos, la mayoría de los padres creen que jayme bebés son hermosos. La atención de seguimiento es ish parte clave de deleon tratamiento y seguridad. Asegúrese de hacer y acudir a todas las citas, y llame a deleon médico si está teniendo problemas. También es ish buena idea saber los resultados de jayme exámenes y mantener ish lista de los medicamentos que toni. ¿Cómo puede cuidarse en el hogar? Prepárese para amamantar  · Si amamanta, siga comiendo alimentos saludables. · Si deleon proveedor de Sarah se lo recomienda, siga tomando jayme vitaminas prenatales. · Hable con deleon médico antes de osmin cualquier medicamento o suplemento. Eso es porque algunos medicamentos pueden afectar la [place]. · Puede ayudar a evitar dolor en los pezones alimentando a deleon bebé en la posición correcta. Las [employer] ayudarán a aprender [employer]. Elija el método anticonceptivo correcto después de que nazca el bebé  · Las mujeres que están amamantando aún pueden Crystal Sorensen. Use un método anticonceptivo si no quiere quedar embarazada. · Los dispositivos intrauterinos (DIU) son muy eficaces para prevenir el embarazo y pueden proporcionar protección contra el embarazo por entre 3 y 11 años, según el tipo.  Si usted habla con deleon médico antes de tener a deleon bebé, pueden colocarle el DIU inmediatamente después de lizzy a toya. Si usted decide que desea quedar embarazada más adelante, pueden extraérselo. Son seguros de usar [place]. · Un implante hormonal también es muy eficaz para prevenir el embarazo. Se coloca debajo de la piel del brazo. [place] puede hacerse inmediatamente después de lizzy a toya. Libera la hormona progestina y previene el embarazo por alrededor de 3 años. También puede extraerlo un médico en cualquier momento. Es seguro de usar mientras está amamantando. · Se puede usar Depo-Provera mientras está amamantando. Es ish inyección que se aplica cada 3 meses. · Las pastillas anticonceptivas funcionan neris. Robbie usted necesita ish clase diferente de pastilla las primeras semanas después de lizzy a toya. Y cuando comience a osmin estas pastillas, tendrá que asegurarse de usar otro tipo de anticonceptivo por 7 días después de comenzar el paquete. · Los diafragmas, los capuchones cervicales y los condones con espermicidas son menos eficaces después de que da a toya. Si usted tiene un diafragma o un capuchón cervical, hable con deleon médico para clyde si necesita un tamaño diferente. · Tanto la ligadura de trompas (atadura de trompas) luis la vasectomía son permanentes. Estas son Radhika Petrin opciones si está ferrari de que ya no va a querer [facility] [place] [facility] hijos. ¿Dónde puede encontrar más información en inglés? Vaya a [email or web address].brown.com/  Elayne [id number] en la búsqueda para aprender más acerca de \"Semana 44 de deleon embarazo: Instrucciones de cuidado. \"  Revisado: 11 febrero, 2020               Versión del contenido: 12.6  © 7387-1417 [employer], [employer]. Las instrucciones de cuidado fueron adaptadas bajo licencia por [employer] (which disclaims liability or warranty for this information). Si usted tiene [place] afección médica o sobre estas instrucciones, siempre pregunte a deleon profesional de ekta.  [employer], [employer] niega toda garantía o responsabilidad por deleon uso de esta información. Conteo de las patadas de deleon bebé: Instrucciones de cuidado  [employer] Your Baby's Kicks: Care Instructions  Instrucciones de cuidado    Contar las patadas de deleon bebé es ish manera en la que deleon médico puede establecer si deleon bebé es saludable. La mayoría de las [place], sobre todo en el primer embarazo, siente que deleon bebé se mueve por primera vez entre las semanas 12 y 25. El movimiento puede sentirse más luis aleteos que luis patadas. Es posible que deleon bebé se mueva más a ciertas horas del día. Cuando usadis [place], puede notar menos patadas que cuando está descansando. En jayme visitas prenatales, deleon médico le preguntará si el bebé está activo. En el último trimestre, deleon médico le puede pedir que cuente la cantidad de veces que sienta que el bebé se [place]. La atención de seguimiento es ish parte clave de deleon tratamiento y seguridad. Asegúrese de hacer y acudir a todas las citas, y llame a deleon médico si está teniendo problemas. También es ish buena idea saber los resultados de jayme exámenes y mantener ish lista de los medicamentos que toni. ¿Cómo se cuentan las patadas fetales? · Un método común para revisar el movimiento de deleon bebé es contar la cantidad de patadas o movimientos que sienta en 1 hora. Es normal sentir 10 movimientos (luis patadas, aleteos o vueltas) en 1 hora. Algunos médicos sugieren que cuente pennie la [facility] llegar a los 10 movimientos. Luego usted puede dejar de contar por kurt día y comenzar otra vez al día siguiente. · Para contar, elija el momento del día en que deleon bebé esté más activo. Podría ser cualquier momento entre la mañana y la noche. · Si no siente 10 movimientos en ish hora, es posible que deleon bebé esté durmiendo. Espere hasta la próxima hora y vuelva a contar. ¿Cuándo debe pedir ayuda?    Llame a deleon médico ahora mismo o busque atención médica inmediata si:    · Siente que deleon bebé ha dejado de moverse o se mueve mucho menos de lo normal.   Preste especial atención a los cambios en deleon ekta y asegúrese de comunicarse con deleon médico si tiene cualquier problema. ¿Dónde puede encontrar más información en inglés? Dylon Alejo a [email or web address]  Nirali Bal M536 en la búsqueda para aprender más acerca de \"Conteo de las patadas de deleon bebé: Instrucciones de cuidado. \"  Revisado: 11 febrero, 2020               Versión del contenido: 12.6  © 7044-5579 Healthwise, Incorporated. Las instrucciones de cuidado fueron adaptadas bajo licencia por [employer] (which disclaims liability or warranty for this information). Si usted tiene [place] afección médica o sobre estas instrucciones, siempre pregunte a deleon profesional de ekta. [employer] niega toda garantía o responsabilidad por deleon uso de esta información.

## 2020-10-22 NOTE — PROGRESS NOTES
2225- Pt ambulates to the unit in stable condition for r/o SROM. 65- Family medicine residents at bedside to assess patient. Orders for amnisure order at this time. 2245- Amnisure results as negative. [phone]- Residents made aware of negative results. Discharge orders will be put in at this time. NST to be ordered before pt discharged. 2305-  at nurses station and made aware of pt assessment by both this RN and resident doctors. MD clears pt to be discharged. [id number] [phone]- This RN at bedside to discuss discharge instructions and educate patient and family. Opportunity to ask questions and voice concerns, none at this time. 2321- Pt ambulates off the unit in stable condition.

## 2020-10-22 NOTE — H&P
[facility] [address] [place] [address]   Office [phone], Fax [phone]      History & Physical    Name: Keron Ramos MRN: [id number]  SSN: [id number]    YOB: 1990  Age: 27 y.o. Sex: female      Subjective:     Reason for Observation:  Pregnancy and Rule Out Labor    History of Present Illness: Ms. Nichelle Mercedes is a 27 y.o.   female with an estimated gestational age of 36w3d with Estimated Date of Delivery: 10/27/20. Patient says that she had a gush of clear liquid that soaked through her underwear at about 9pm when she was about to take a shower. She says that she has not had any leaking of fluid after that time. Since that time she has been feeling baby move more. She has not had any vaginal bleeding or contractions. Pregnancy has been uncomplicated. Denies complications with previous pregnancies, including blood pressure and diabetes. Denies complications with previous deliveries, including PPH. Had miscarriage in 2016 with second pregnancy, but had a subsequent full-term pregnancy later in that year. Has not had any c-sections or other surgeries. Patient denies abdominal pain  , chest pain, contractions, fever, headache , nausea and vomiting, right upper quadrant pain  , shortness of breath, swelling, vaginal bleeding  and visual disturbances. OB History    Para Term  AB Living   4 2 2 0 1 2   SAB TAB Ectopic Molar Multiple Live Births   1 0 0   1 2      # Outcome Date GA Lbr Tevin/2nd Weight Sex Delivery Anes PTL Lv   4 Current            3 Term 16 39w4d 09:44 / 00:34 3.29 kg M Vag-Spont EPIDURAL AN N CASIE   2A SAB 01/15/16           2B SAB 01/15/16           1 Term 10/29/12   3.487 kg F Vag-Spont None N CASIE      Obstetric Comments   Menarche:  6. LMP: 3/15/16. # of Children:  1. Age at Delivery of First Child:  25.   Hysterectomy/oophorectomy:  NO/NO.   Breast Bx:  No.  Hx of Breast Feeding:  No.  BCP:  No. Hormone therapy:  No.     Past Medical History:   Diagnosis Date    Anemia 2012    Depression 2013    Vanishing twin syndrome 2016     No past surgical history on file. Social History     Occupational History    Not on file   Tobacco Use    Smoking status: Never Smoker    Smokeless tobacco: Never Used   Substance and Sexual Activity    Alcohol use: No    Drug use: No    Sexual activity: Yes     Partners: Male     Birth control/protection: None      Family History   Problem Relation Age of Onset    Diabetes Mother      No Known Allergies  Prior to Admission medications    Medication Sig Start Date End Date Taking? Authorizing Provider   riboflavin, vitamin B2, (Vitamin B-2) 100 mg tablet Take 100 mg by mouth two (2) times a day. Elicia Andino MD   pyridoxine, vitamin B6, (VITAMIN B-6) 25 mg tablet Take 1 Tab by mouth daily. 20   Yanira Beatty MD   prenatal BUDF01/WERG fum/folic (prenatal vitamin) 27 mg iron- 800 mcg tab tablet Take 1 Tab by mouth daily. 20   Yanira Beatty MD      Review of Systems:  A comprehensive review of systems was negative except for that written in the History of Present Illness. Objective:     Vitals: There were no vitals filed for this visit. No data recorded. No data recorded     Physical Exam:  Patient without distress.   Heart: Regular rate and rhythm, S1/S2 present or without murmur or extra heart sounds  Lung: clear to auscultation throughout lung fields, no wheezes, no rales, no rhonchi and normal respiratory effort  Back: costovertebral angle tenderness absent  Abdomen: soft, nontender  Fundus: soft and non tender  Cervical Exam: per patient she was closed yesterday  Lower Extremities: Edema No     Membranes:  Intact  Uterine Activity:  None  Fetal Heart Rate:  Reactive  Baseline: 130-135 per minute  Variability: moderate  Accelerations: yes  Decelerations: none  Uterine contractions: irregular, every 7-10 minutes on the monitor       Lab/Data Review:  No results found for this or any previous visit (from the past 24 hour(s)). Assessment and Plan:     Ms. Ramesh Verma is a 27 y.o. [id number]  female with an estimated gestational age of 36w3d who is Observation for Rule Out Labor. 1. Rule Out: gush w/o leakage. Not padmini. Baby moving well. Was checked yesterday and was closed per patient. - Aminsure negative  - NST reactive  - Will discharge with instructions for when to come back and further precautions. - Continue with routine pre- course. 2. SIUP - Covid pending (negative on 10/12)  PNL: A, Rh pos, Hgb 13.3, GC/chlam neg, VZV/rub imm, RPR/HIV NR, Hep B neg, Pap nml, dating by LMP consistent with US at 10w 6d. 1hr GTT wnl. S/p Flu and Tdap. GBS negative  - Covid negative on 10/12.  - Cervix closed/thick/high yesterday. - Pt declined genetic screening  - 20 wk scan with normal anatomy  - Cephalic  by ultrasound. I have discussed the diagnosis with the patient and the intended plan as seen in the above orders. All questions were answered concerning future plans. I have discussed medication side effects and warnings with the patient as well. Labor precautions discussed, including: Regular painful contractions, lasting for greater than one hour, taking your breath away; any vaginal bleeding; any leakage of fluid; or absent or decreased fetal movement. Call M.D. on call if any of these symptoms or signs occur.     Patient discussed with Dr. Oskar Pelaez MD  Family Medicine Resident

## 2020-10-26 ENCOUNTER — ANESTHESIA EVENT (OUTPATIENT)
Dept: LABOR AND DELIVERY | Age: 30
End: 2020-10-26

## 2020-10-26 ENCOUNTER — HOSPITAL ENCOUNTER (INPATIENT)
Age: 30
LOS: 1 days | Discharge: HOME | End: 2020-10-27
Attending: FAMILY MEDICINE | Admitting: OBSTETRICS & GYNECOLOGY

## 2020-10-26 ENCOUNTER — ANESTHESIA (OUTPATIENT)
Dept: LABOR AND DELIVERY | Age: 30
End: 2020-10-26

## 2020-10-26 PROBLEM — Z34.93 PREGNANT AND NOT YET DELIVERED IN THIRD TRIMESTER: Status: ACTIVE | Noted: 2020-10-26

## 2020-10-26 LAB
BASOPHILS # BLD: 0 K/UL (ref 0–0.1)
BASOPHILS NFR BLD: 1 % (ref 0–1)
DIFFERENTIAL METHOD BLD: ABNORMAL
EOSINOPHIL # BLD: 0.1 K/UL (ref 0–0.4)
EOSINOPHIL NFR BLD: 1 % (ref 0–7)
ERYTHROCYTE [DISTWIDTH] IN BLOOD BY AUTOMATED COUNT: 13.8 % (ref 11.5–14.5)
HCT VFR BLD AUTO: 31.1 % (ref 35–47)
HGB BLD-MCNC: 10.1 G/DL (ref 11.5–16)
IMM GRANULOCYTES # BLD AUTO: 0 K/UL (ref 0–0.04)
IMM GRANULOCYTES NFR BLD AUTO: 1 % (ref 0–0.5)
LYMPHOCYTES # BLD: 2 K/UL (ref 0.8–3.5)
LYMPHOCYTES NFR BLD: 23 % (ref 12–49)
MCH RBC QN AUTO: 28.6 PG (ref 26–34)
MCHC RBC AUTO-ENTMCNC: 32.5 G/DL (ref 30–36.5)
MCV RBC AUTO: 88.1 FL (ref 80–99)
MONOCYTES # BLD: 0.7 K/UL (ref 0–1)
MONOCYTES NFR BLD: 8 % (ref 5–13)
NEUTS SEG # BLD: 5.6 K/UL (ref 1.8–8)
NEUTS SEG NFR BLD: 66 % (ref 32–75)
NRBC # BLD: 0 K/UL (ref 0–0.01)
NRBC BLD-RTO: 0 PER 100 WBC
PLATELET # BLD AUTO: 234 K/UL (ref 150–400)
PMV BLD AUTO: 10.8 FL (ref 8.9–12.9)
RBC # BLD AUTO: 3.53 M/UL (ref 3.8–5.2)
WBC # BLD AUTO: 8.4 K/UL (ref 3.6–11)

## 2020-10-26 PROCEDURE — 85025 COMPLETE CBC W/AUTO DIFF WBC: CPT

## 2020-10-26 PROCEDURE — 75410000003 HC RECOV DEL/VAG/CSECN EA 0.5 HR: Performed by: OBSTETRICS & GYNECOLOGY

## 2020-10-26 PROCEDURE — 74011000250 HC RX REV CODE- 250: Performed by: ANESTHESIOLOGY

## 2020-10-26 PROCEDURE — 77030014125 HC TY EPDRL BBMI -B: Performed by: ANESTHESIOLOGY

## 2020-10-26 PROCEDURE — 74011250637 HC RX REV CODE- 250/637: Performed by: STUDENT IN AN ORGANIZED HEALTH CARE EDUCATION/TRAINING PROGRAM

## 2020-10-26 PROCEDURE — 75810000275 HC EMERGENCY DEPT VISIT NO LEVEL OF CARE

## 2020-10-26 PROCEDURE — 75410000000 HC DELIVERY VAGINAL/SINGLE: Performed by: OBSTETRICS & GYNECOLOGY

## 2020-10-26 PROCEDURE — 00HU33Z INSERTION OF INFUSION DEVICE INTO SPINAL CANAL, PERCUTANEOUS APPROACH: ICD-10-PCS | Performed by: ANESTHESIOLOGY

## 2020-10-26 PROCEDURE — 36415 COLL VENOUS BLD VENIPUNCTURE: CPT

## 2020-10-26 PROCEDURE — 74011250636 HC RX REV CODE- 250/636: Performed by: OBSTETRICS & GYNECOLOGY

## 2020-10-26 PROCEDURE — 75410000002 HC LABOR FEE PER 1 HR: Performed by: OBSTETRICS & GYNECOLOGY

## 2020-10-26 PROCEDURE — 76060000078 HC EPIDURAL ANESTHESIA: Performed by: ANESTHESIOLOGY

## 2020-10-26 PROCEDURE — 0HQ9XZZ REPAIR PERINEUM SKIN, EXTERNAL APPROACH: ICD-10-PCS | Performed by: OBSTETRICS & GYNECOLOGY

## 2020-10-26 PROCEDURE — 74011250636 HC RX REV CODE- 250/636: Performed by: ANESTHESIOLOGY

## 2020-10-26 PROCEDURE — 65270000029 HC RM PRIVATE

## 2020-10-26 RX ORDER — NALOXONE HYDROCHLORIDE 0.4 MG/ML
0.4 INJECTION, SOLUTION INTRAMUSCULAR; INTRAVENOUS; SUBCUTANEOUS AS NEEDED
Status: DISCONTINUED | OUTPATIENT
Start: 2020-10-26 | End: 2020-10-27 | Stop reason: HOSPADM

## 2020-10-26 RX ORDER — IBUPROFEN 800 MG/1
800 TABLET ORAL EVERY 8 HOURS
Status: DISCONTINUED | OUTPATIENT
Start: 2020-10-26 | End: 2020-10-27 | Stop reason: HOSPADM

## 2020-10-26 RX ORDER — DOCUSATE SODIUM 100 MG/1
100 CAPSULE, LIQUID FILLED ORAL 2 TIMES DAILY
Status: DISCONTINUED | OUTPATIENT
Start: 2020-10-26 | End: 2020-10-27 | Stop reason: HOSPADM

## 2020-10-26 RX ORDER — OXYTOCIN/RINGER'S LACTATE 30/500 ML
10 PLASTIC BAG, INJECTION (ML) INTRAVENOUS AS NEEDED
Status: DISCONTINUED | OUTPATIENT
Start: 2020-10-26 | End: 2020-10-27 | Stop reason: HOSPADM

## 2020-10-26 RX ORDER — ONDANSETRON 2 MG/ML
4 INJECTION INTRAMUSCULAR; INTRAVENOUS
Status: DISCONTINUED | OUTPATIENT
Start: 2020-10-26 | End: 2020-10-26 | Stop reason: HOSPADM

## 2020-10-26 RX ORDER — OXYTOCIN/RINGER'S LACTATE 30/500 ML
95 PLASTIC BAG, INJECTION (ML) INTRAVENOUS AS NEEDED
Status: DISCONTINUED | OUTPATIENT
Start: 2020-10-26 | End: 2020-10-27 | Stop reason: HOSPADM

## 2020-10-26 RX ORDER — SODIUM CHLORIDE 0.9 % (FLUSH) 0.9 %
5-40 SYRINGE (ML) INJECTION EVERY 8 HOURS
Status: DISCONTINUED | OUTPATIENT
Start: 2020-10-26 | End: 2020-10-26

## 2020-10-26 RX ORDER — ACETAMINOPHEN 325 MG/1
650 TABLET ORAL
Status: DISCONTINUED | OUTPATIENT
Start: 2020-10-26 | End: 2020-10-26 | Stop reason: HOSPADM

## 2020-10-26 RX ORDER — LIDOCAINE HYDROCHLORIDE 10 MG/ML
10 INJECTION INFILTRATION; PERINEURAL ONCE
Status: DISCONTINUED | OUTPATIENT
Start: 2020-10-26 | End: 2020-10-26 | Stop reason: HOSPADM

## 2020-10-26 RX ORDER — HYDROCORTISONE ACETATE PRAMOXINE HCL 2.5; 1 G/100G; G/100G
CREAM TOPICAL AS NEEDED
Status: DISCONTINUED | OUTPATIENT
Start: 2020-10-26 | End: 2020-10-27 | Stop reason: HOSPADM

## 2020-10-26 RX ORDER — FENTANYL/BUPIVACAINE/NS/PF 2-1250MCG
1-16 PREFILLED PUMP RESERVOIR EPIDURAL CONTINUOUS
Status: DISCONTINUED | OUTPATIENT
Start: 2020-10-26 | End: 2020-10-27 | Stop reason: HOSPADM

## 2020-10-26 RX ORDER — SODIUM CHLORIDE 0.9 % (FLUSH) 0.9 %
5-40 SYRINGE (ML) INJECTION AS NEEDED
Status: DISCONTINUED | OUTPATIENT
Start: 2020-10-26 | End: 2020-10-26

## 2020-10-26 RX ORDER — DOCUSATE SODIUM 100 MG/1
100 CAPSULE, LIQUID FILLED ORAL
Status: DISCONTINUED | OUTPATIENT
Start: 2020-10-26 | End: 2020-10-26

## 2020-10-26 RX ORDER — NALOXONE HYDROCHLORIDE 0.4 MG/ML
0.4 INJECTION, SOLUTION INTRAMUSCULAR; INTRAVENOUS; SUBCUTANEOUS AS NEEDED
Status: DISCONTINUED | OUTPATIENT
Start: 2020-10-26 | End: 2020-10-26 | Stop reason: HOSPADM

## 2020-10-26 RX ORDER — CALCIUM CARBONATE 200(500)MG
400 TABLET,CHEWABLE ORAL
Status: DISCONTINUED | OUTPATIENT
Start: 2020-10-26 | End: 2020-10-26 | Stop reason: HOSPADM

## 2020-10-26 RX ORDER — HYDROCODONE BITARTRATE AND ACETAMINOPHEN 5; 325 MG/1; MG/1
1 TABLET ORAL
Status: DISCONTINUED | OUTPATIENT
Start: 2020-10-26 | End: 2020-10-27 | Stop reason: HOSPADM

## 2020-10-26 RX ORDER — SODIUM CHLORIDE, SODIUM LACTATE, POTASSIUM CHLORIDE, CALCIUM CHLORIDE 600; 310; 30; 20 MG/100ML; MG/100ML; MG/100ML; MG/100ML
125 INJECTION, SOLUTION INTRAVENOUS CONTINUOUS
Status: DISCONTINUED | OUTPATIENT
Start: 2020-10-26 | End: 2020-10-27 | Stop reason: HOSPADM

## 2020-10-26 RX ORDER — EPHEDRINE SULFATE/0.9% NACL/PF 50 MG/5 ML
20 SYRINGE (ML) INTRAVENOUS
Status: DISCONTINUED | OUTPATIENT
Start: 2020-10-26 | End: 2020-10-26 | Stop reason: HOSPADM

## 2020-10-26 RX ORDER — MINERAL OIL
120 OIL (ML) ORAL ONCE
Status: DISCONTINUED | OUTPATIENT
Start: 2020-10-26 | End: 2020-10-26 | Stop reason: HOSPADM

## 2020-10-26 RX ORDER — LIDOCAINE HYDROCHLORIDE AND EPINEPHRINE 15; 5 MG/ML; UG/ML
INJECTION, SOLUTION EPIDURAL AS NEEDED
Status: DISCONTINUED | OUTPATIENT
Start: 2020-10-26 | End: 2020-10-26 | Stop reason: HOSPADM

## 2020-10-26 RX ORDER — OXYTOCIN/RINGER'S LACTATE 30/500 ML
0-20 PLASTIC BAG, INJECTION (ML) INTRAVENOUS
Status: DISCONTINUED | OUTPATIENT
Start: 2020-10-26 | End: 2020-10-27 | Stop reason: HOSPADM

## 2020-10-26 RX ORDER — BUPIVACAINE HYDROCHLORIDE 2.5 MG/ML
INJECTION, SOLUTION EPIDURAL; INFILTRATION; INTRACAUDAL AS NEEDED
Status: DISCONTINUED | OUTPATIENT
Start: 2020-10-26 | End: 2020-10-26 | Stop reason: HOSPADM

## 2020-10-26 RX ADMIN — LIDOCAINE HYDROCHLORIDE AND EPINEPHRINE 3 ML: 15; 5 INJECTION, SOLUTION EPIDURAL at 02:34

## 2020-10-26 RX ADMIN — IBUPROFEN 800 MG: 800 TABLET ORAL at 21:57

## 2020-10-26 RX ADMIN — BUPIVACAINE HYDROCHLORIDE 10 ML: 2.5 INJECTION, SOLUTION EPIDURAL; INFILTRATION; INTRACAUDAL; PERINEURAL at 02:33

## 2020-10-26 RX ADMIN — IBUPROFEN 800 MG: 800 TABLET ORAL at 13:57

## 2020-10-26 RX ADMIN — Medication 10 ML/HR: at 02:45

## 2020-10-26 RX ADMIN — SODIUM CHLORIDE, SODIUM LACTATE, POTASSIUM CHLORIDE, AND CALCIUM CHLORIDE 1000 ML: 600; 310; 30; 20 INJECTION, SOLUTION INTRAVENOUS at 02:27

## 2020-10-26 RX ADMIN — DOCUSATE SODIUM 100 MG: 100 CAPSULE, LIQUID FILLED ORAL at 18:19

## 2020-10-26 RX ADMIN — SODIUM CHLORIDE, SODIUM LACTATE, POTASSIUM CHLORIDE, AND CALCIUM CHLORIDE 999 ML/HR: 600; 310; 30; 20 INJECTION, SOLUTION INTRAVENOUS at 01:45

## 2020-10-26 RX ADMIN — SODIUM CHLORIDE, SODIUM LACTATE, POTASSIUM CHLORIDE, AND CALCIUM CHLORIDE 125 ML/HR: 600; 310; 30; 20 INJECTION, SOLUTION INTRAVENOUS at 03:20

## 2020-10-26 RX ADMIN — OXYTOCIN 999 MILLI-UNITS/MIN: 10 INJECTION INTRAVENOUS at 05:41

## 2020-10-26 NOTE — L&D DELIVERY NOTE
Delivery Summary    Patient: Migdalia Hathaway MRN: [id number]  SSN: [id number]    YOB: 1990  Age: 27 y.o. Sex: female       Information for the patient's : Zoila Bowman [[id number]]       Labor Events:    Labor: No    Steroids: None   Cervical Ripening Date/Time:       Cervical Ripening Type: None   Antibiotics During Labor: No   Rupture Identifier:      Rupture Date/Time: 10/26/2020 5:20 AM   Rupture Type: AROM;Bulging   Amniotic Fluid Volume:      Amniotic Fluid Description: Clear    Amniotic Fluid Odor: None    Induction: None       Induction Date/Time:        Indications for Induction:      Augmentation: None   Augmentation Date/Time:      Indications for Augmentation:     Labor complications: None       Additional complications:        Delivery Events:  Indications For Episiotomy:     Episiotomy: None   Perineal Laceration(s): None   Repaired:     Periurethral Laceration Location:      Repaired:     Labial Laceration Location:     Repaired:     Sulcal Laceration Location:     Repaired:     Vaginal Laceration Location: left   Repaired: Yes   Cervical Laceration Location:     Repaired:     Repair Suture: Vicryl 3-0   Number of Repair Packets:     Estimated Blood Loss (ml):  ml   Quantitative Blood Loss (ml)                Delivery Date: 10/26/2020    Delivery Time: 5:37 AM  Delivery Type: Vaginal, Spontaneous  Sex:  Female    Gestational Age: 37w11d   Delivery Clinician:  Terrell Franco  Living Status: Living   Delivery Location: L&D Room 206          APGARS  One minute Five minutes Ten minutes   Skin color: 1   1        Heart rate: 2   2        Grimace: 2   2        Muscle tone: 2   2        Breathin   2        Totals: 9   9            Presentation: Compound    Position:        Resuscitation Method:  Tactile Stimulation;Suctioning-bulb     Meconium Stained:        Cord Information: 3 Vessels  Complications: None  Cord around:    Delayed cord clamping?  Yes  Cord clamped date/time:10/26/2020  5:40 AM  Disposition of Cord Blood: Discard    Blood Gases Sent?: No    Placenta:  Date/Time: 10/26/2020  5:46 AM  Removal: Expressed      Appearance: Normal;Intact     Murtaugh Measurements:  Birth Weight:        Birth Length:        Head Circumference:        Chest Circumference:       Abdominal Girth: Other Providers:   Octavio MONTEIRO;JULIUS CHI;FRANDY JOYNER;;;;;;;;LEANDER VERDIN;ROSITA ZHANG, Obstetrician;Primary Nurse;Primary Murtaugh Nurse;Nicu Nurse;Neonatologist;Anesthesiologist;Crna;Nurse Practitioner;Midwife;Nursery Nurse;Resident; Resident           Group B Strep:   Lab Results   Component Value Date/Time    GrBStrep, External Negative 2020     Information for the patient's : Thomas Pat Female Mandy Reed [[id number]]   No results found for: ABORH, PCTABR, PCTDIG, BILI, ABORHEXT, ABORH     No results for input(s): PCO2CB, PO2CB, HCO3I, SO2I, IBD, PTEMPI, SPECTI, PHICB, ISITE, IDEV, IALLEN in the last 72 hours. Delivery Note   of a term viable female infant with compound ZOE presentation with apgars 9,9. The infant was placed on the mother's abdomen upon deliver and clamping of the umbilical cord was delayes for 1 minute. The umbilical cord was then clamped and was cut by the FOB. The placenta was manually expressed and appeared intact. There was a small left vaginal laceration that was repaired with 3-0 Vicryl.  ml.

## 2020-10-26 NOTE — ANESTHESIA PROCEDURE NOTES
Epidural Block    Start time: 10/26/2020 2:31 AM  End time: 10/26/2020 2:36 AM  Performed by: Scarlett Ny MD  Authorized by: Scarlett Ny MD     Pre-Procedure  Indication: labor epidural    Preanesthetic Checklist: patient identified, risks and benefits discussed, anesthesia consent, timeout performed and anesthesia consent    Timeout Time: 02:31        Epidural:   Patient position:  Seated  Prep region:  Lumbar  Prep: Betadine    Location:  L3-4    Needle and Epidural Catheter:   Needle Type:  Tuohy  Needle Gauge:  17 G  Injection Technique:  Loss of resistance using air  Attempts:  1  Catheter Size:  18 G  Events: no paresthesia and negative aspiration test    Test Dose:  Lidocaine 1.5% w/ epi and negative    Assessment:   Catheter Secured:  Tegaderm and tape  Insertion:  Uncomplicated  Patient tolerance:  Patient tolerated the procedure well with no immediate complications

## 2020-10-26 NOTE — H&P
History & Physical    Name: Umer Colón MRN: [id number]  SSN: [id number]    YOB: 1990  Age: 27 y.o. Sex: female        Subjective:     Estimated Date of Delivery: 10/27/20  OB History    Para Term  AB Living   4 2 2 0 1 2   SAB TAB Ectopic Molar Multiple Live Births   1 0 0   1 2      # Outcome Date GA Lbr Tevin/2nd Weight Sex Delivery Anes PTL Lv   4 Current            3 Term 16 39w4d 09:44 / 00:34 3.29 kg M Vag-Spont EPIDURAL AN N CASIE   2A SAB 01/15/16           2B SAB 01/15/16           1 Term 10/29/12   3.487 kg F Vag-Spont None N CASIE      Obstetric Comments   Menarche:  11. LMP: 3/15/16. # of Children:  1. Age at Delivery of First Child:  25.   Hysterectomy/oophorectomy:  NO/NO. Breast Bx:  No.  Hx of Breast Feeding:  No.  BCP:  No. Hormone therapy:  No.       Ms. Eboni Rodrigues is a 27 y.o. [address] seen with pregnancy at 39w6d by LMP, confirmed by [address] [place], [place] [address], for active labor. Prenatal course was normal. Patient reports good fetal movement. Reports no ROM or vaginal bleeding. Regular contractions every 2-3 minutes began last night around 10PM. Patient denies ROM. Patient denies changes in vision, headache, fever, CP, SOB, nausea/vomiting, RUQ pain, LE edema, and calf tenderness/pain. Please see prenatal records for details. Of note: H/o Depression and Post Partum Depression (per patient, after delivery of 2nd pregnancy did not require medication). Denies depression this pregnacy. No home meds. Past Medical History:   Diagnosis Date    Anemia 2012    Depression 2013    Postpartum depression     Vanishing twin syndrome 2016     No past surgical history on file.   Social History     Occupational History    Not on file   Tobacco Use    Smoking status: Never Smoker    Smokeless tobacco: Never Used   Substance and Sexual Activity    Alcohol use: No    Drug use: No    Sexual activity: Yes     Partners: Male     Birth control/protection: None Family History   Problem Relation Age of Onset    Diabetes Mother        No Known Allergies  Prior to Admission medications    Medication Sig Start Date End Date Taking? Authorizing Provider   prenatal DWMY69/AZTO fum/folic (prenatal vitamin) 27 mg iron- 800 mcg tab tablet Take 1 Tab by mouth daily. 20  Yes Yanira Beatty MD   riboflavin, vitamin B2, (Vitamin B-2) 100 mg tablet Take 100 mg by mouth two (2) times a day. Elicia Andino MD   pyridoxine, vitamin B6, (VITAMIN B-6) 25 mg tablet Take 1 Tab by mouth daily. 20   Yanira Beatty MD        Review of Systems: Review of Systems   Constitutional: Negative for chills and fever. HENT: Negative for rhinorrhea, sinus pain and sore throat. Respiratory: Negative for cough, chest tightness and shortness of breath. Cardiovascular: Negative for chest pain, palpitations and leg swelling. Genitourinary: Negative for dysuria. Neurological: Negative for dizziness and headaches. Objective:     Vitals:  Vitals:    10/26/20 0226 10/26/20 0231 10/26/20 0235 10/26/20 0236   BP:   92/63    Pulse:   84    Resp:       Temp:       SpO2: 100% 100%  100%   Weight:       Height:            Physical Exam:  Patient without distress.   Heart: Regular rate and rhythm  Lung: clear to auscultation throughout lung fields, no wheezes, no rales, no rhonchi and normal respiratory effort  Back: costovertebral angle tenderness absent  Abdomen: soft, nontender, gravid  Fundus: soft and non tender  Lower Extremities:  - Edema No   - No evidence of DVT seen on physical exam.  Cervical Exam: Cervical Exam  Dilation (cm): 8  Eff: 100 %  Station: -1  Vaginal exam done by? : LANDY Gomez RNC  Membranes:  Intact  Fetal Heart Rate: Reactive  Baseline: 145 per minute  Variability: moderate  Accelerations: yes  Decelerations: none  Uterine contractions: regular, every 2-3 minutes    Prenatal Labs:   Lab Results   Component Value Date/Time    Rubella, External Immune 2020    GrBStrep, External Negative 2020    HBsAg, External Negative 2020    HIV, External Non Reactive 2020    RPR, External Non Reactive 2020    Gonorrhea, External Negative 2020    Chlamydia, External Negative 2020         Assessment/Plan:     Shiela Esparza is a 27 y.o.  at 39w6d by LMP, confirmed by 1st Trimester US, that is admitted for Active Labor.  PNL: A +, Ab Neg., G/C//RPR/HIV/Hep B Neg., Varicella/Rubella Imm., Hgb frac. wnl, 1hr/3hr GTT wnl, GBS Neg.    Pregnancy with out complications   U/S : Normal anatomy cephalic presentation     Active Labor:   - Cat 1 tracing, contractions regular every 2-3 minutes  - Vitals wnl  - LR IVF  - Largest Baby: 7lb 11oz  - Epidural  - Labor management  - Expecting baby girl, B&B feeding  - Planned      Other:  H/o Depression: Asymptomatic. Denies SI/HI. No home med.   - Monitor post delivery w/ Darlin Serna     Patient discussed with Dr. Kitty Covarrubias MD  Family Medicine Resident

## 2020-10-26 NOTE — DISCHARGE SUMMARY
Obstetrical Discharge Summary     Name: Stefano Sanchez MRN: [id number]  SSN: [id number]    YOB: 1990  Age: 27 y.o. Sex: female      Admit Date: 10/26/2020    Discharge Date: 10/27/2020     Admitting Physician: Mateo De Santiago MD     Attending Physician:  Rui Calvillo DO     Admission Diagnoses: Pregnant and not yet delivered in third trimester [Z34.93]    Discharge Diagnoses:   Information for the patient's : Shadi Byrd, Female Gladies Space [[id number]]   Delivery of a 3.445 kg female infant via Vaginal, Spontaneous on 10/26/2020 at 5:37 AM  by Mateo De Santiago. Apgars were 9  and 9 . Additional Diagnoses:   Hospital Problems  Date Reviewed: 10/12/2020          Codes Class Noted POA    Pregnant and not yet delivered in third trimester ICD-10-CM: Z34.93  ICD-9-CM: V22.1  10/26/2020 Unknown             Lab Results   Component Value Date/Time    Rubella, External Immune 2020    GrBStrep, External Negative 2020       Immunization(s):   Immunization History   Administered Date(s) Administered    Influenza Vaccine [employer]) PF (>6 Mo Flulaval, Fluarix, and >3 Yrs Afluria, Fluzone 69659) 2020    TDAP Vaccine 10/31/2012    Tdap 2020        Hospital Course:   Patient is a 27 y.o. [id number] s/p  at 39 weeks 6 days. Presented for  Active Labor. Pregnancy complicated by anemia and hx of depression/postpartum depression (not currently medicated). Labor was uncomplicated. Delivered TLFI by  with 1st degree left vaginal laceration that was repaired with 3-0 vicryl. Normal hospital course following the delivery. On day of discharge patient reported minimal lochia, well controlled pain, and no other complaints. She is breast and bottle feeding her baby. Discharged with pain regimen and bowel regimen. Advised to continue prenatal vitamins. Will also discharge home on iron supplement. Follow up with OB/PCP in 6 weeks.      Depression Scale  EPDS Score: 4    Follow up test at discharge: None  Condition at Discharge:  Stable  Disposition: Discharge to Home    Physical exam:  Visit Vitals  /62 (BP 1 Location: Left arm, BP Patient Position: At rest)   Pulse 73   Temp 97.5 °F (36.4 °C)   Resp 16   Ht 5' 3\" (1.6 m)   Wt 83 kg (183 lb)   LMP 01/21/2020 (Approximate)   SpO2 92%   Breastfeeding No   BMI 32.42 kg/m²     Exam:  Patient without distress. Resting comfortably in bed. Abdomen soft, fundus firm below level of umbilicus, non tender. Lower extremities are negative for swelling, cords or tenderness. Patient Instructions:   Current Discharge Medication List      START taking these medications    Details   docusate sodium (COLACE) 100 mg capsule Take 1 Cap by mouth two (2) times daily as needed for Constipation. Qty: 30 Cap, Refills: 0      ibuprofen (MOTRIN) 800 mg tablet Take 1 Tab by mouth every eight (8) hours as needed for Pain. Qty: 30 Tab, Refills: 0      ferrous sulfate (IRON) 325 mg (65 mg iron) EC tablet Take 1 Tab by mouth two (2) times daily (with meals). Qty: 60 Tab, Refills: 2         CONTINUE these medications which have NOT CHANGED    Details   prenatal LGVM59/ZWTA fum/folic (prenatal vitamin) 27 mg iron- 800 mcg tab tablet Take 1 Tab by mouth daily. Qty: 90 Tab, Refills: 3    Associated Diagnoses: Encounter for supervision of low-risk pregnancy in first trimester      riboflavin, vitamin B2, (Vitamin B-2) 100 mg tablet Take 100 mg by mouth two (2) times a day. pyridoxine, vitamin B6, (VITAMIN B-6) 25 mg tablet Take 1 Tab by mouth daily. Qty: 90 Tab, Refills: 1    Associated Diagnoses: Nausea and vomiting in pregnancy             Reference my discharge instructions.     Follow-up Information     Follow up With Specialties Details Why [address]  Schedule an appointment as soon as possible for a visit Please call in mid-November to schedule a postpartum follow up appointment for approximately 6 weeks from now (around 2020) Nicole 24    Leyla Ruiz MD Family Medicine Go on 10/29/2020 9:40 AM -  appointment and weight check (please arrive at 9:30 AM) [address]      Jacqui Fletcher, [address]   [facility]  [phone]              Signed By:  Luciana Lane DO    Family Medicine Resident

## 2020-10-26 NOTE — ROUTINE PROCESS
Bedside and Verbal shift change report given to ANTONINO Nesbitt RN (oncoming nurse) by Magalys Perry RN (offgoing nurse). Report included the following information SBAR, Kardex, Intake/Output, MAR and Recent Results.

## 2020-10-26 NOTE — LACTATION NOTE
This note was copied from a baby's chart. Reviewed breastfeeding basics:  Supply and demand,  stomach size, early  Feeding cues, skin to skin, positioning and baby led latch-on,  latched with signs of good, deep latch vs shallow, feeding frequency and duration, and log sheet for tracking infant feedings and output. Breastfeeding Booklet and Warm line information given. Discussed typical  weight loss and the importance of infant weight checks with pediatrician 1-2 post discharge. Hand Expression Education:  Mom taught how to manually hand express her colostrum. Emphasized the importance of providing infant with valuable colostrum as infant rests skin to skin at breast.  Aware to avoid extended periods of non-feeding. Aware to offer 10-20+ drops of colostrum every 2-3 hours until infant is latching and nursing effectively. Taught the rationale behind this low tech but highly effective evidence based practice. Drops noted. Discussed with mother her plan for feeding. Reviewed the benefits of exclusive breast milk feeding during the hospital stay. Informed her of the risks of using formula to supplement in the first few days of life as well as the benefits of successful breast milk feeding; referred her to the Breastfeeding booklet about this information. She acknowledges understanding of information reviewed and states that it is her plan to both breast and formula feed her infant. Will support her choice and offer additional information as needed. Pt will successfully establish breastfeeding by feeding in response to early feeding cues   or wake every 3h, will obtain deep latch, and will keep log of feedings/output. Taught to BF at hunger cues and or q 2-3 hrs and to offer 10-20 drops of hand expressed colostrum at any non-feeds.       Breast Assessment  Left Breast: Medium, Large  Left Nipple: Everted, Intact  Right Breast: Medium, Large  Right Nipple: Everted, Intact  Breast- Feeding Assessment  Attends Breast-Feeding Classes: No  Breast-Feeding Experience: Yes(4 months  with first)  Breast Trauma/Surgery: No  Type/Quality: Good  Lactation Consultant Visits  Breast-Feedings: Good   Mother/Infant Observation  Mother Observation: Breast comfortable, Close hold, Gush lochia, Holds breast, Lets baby end feeding  Infant Observation: Audible swallows, Breast tissue moves, Latches nipple and aereolae, Lips flanged, lower, Lips flanged, upper, Opens mouth  LATCH Documentation  Latch: Grasps breast, tongue down, lips flanged, rhythmic sucking  Audible Swallowing: A few with stimulation  Type of Nipple: Everted (after stimulation)  Comfort (Breast/Nipple): Soft/non-tender  Hold (Positioning): No assist from staff, mother able to position/hold infant  LATCH Score: 9

## 2020-10-26 NOTE — PROGRESS NOTES
07:10- OB SBAR report received from Kaylin stephenson RN    09:20- Epidural catheter removed without complication. Pt able to ambulate with standby assistance to BR. Pt able to void 800cc urine. RN teaches pericare. Pads changed, ice applied and tucks applied to perineum.  Pt back to bed to eat breakfast.

## 2020-10-26 NOTE — PROGRESS NOTES
0128:  at 39.6 arrives with c/o worsening contractions since . Pt denies loss of fluid or vaginal bleeding. Pt reports fetal movement. Pt denies complications of pregnancy. 0133: SVE done. Pt requesting epidural.    0145: IV fluid bolus initiated for epidural at this time. 0155: Dr. Juany Bills notified by Marcos Interiano RN (charge nurse) of pt's arrival, sve, negative covid status, and bolusing for epidural. Continue with labor management. 0225: Pt assisted to the sitting position at side of bed in preparation for epidural procedure. RN assisting patient with positioning. 0226: Dr. Viktor Mcgee at bedside evaluating patient for epidural placement. 9287: Pt assisted to the left tilt position after epidural placement. Pt tolerated procedure well. EFM readjusted. 0255: Dr. Gregory Cruz at bedside to evaluate patient. 6130: Dr. Juany Bills at bedside to evaluate patient. 0440: Pt called out with feelings of rectal pressure. SVE 10/100/+1 with bulging bag of water. 6510: Dr. Juany Bills and Dr. Gregory Cruz notified of pt's sve and feelings of rectal pressure. Dr. Juany Bills states will come to bedside for AROM shortly and can begin pushing after that. : Dr. Juany Bills, Dr. Gregory Cruz, and Dr. Denny Guzman at bedside. AROM for clear fluid by Dr. Juany Bills. : Pt begins pushing. Dr. Juany Bills and residents set up for delivery and RN remains at bedside continuously assessing FHR.     0707: SBAR report given to PITO Gould.

## 2020-10-26 NOTE — PROGRESS NOTES
10/26/2020  9:51 AM    CM spoke with DANIELLE, via phone, to complete initial assessment and begin discharge planning. MOB verified and confirmed demographics. DANIELLE lives with [facility], along with her 9 and 3yr old, at the address on file. DANIELLE is not employed and plans to be home with baby. FOVA is employed and will be taking adequate time off. DANIELLE reports she has good family support. DANIELLE plans to breast and bottle feed baby. DANIELLE plans to follow with [facility] for pediatric follow up. DANIELLE has car seat, bassinet/crib, clothing, bottles and all necessary supplies for baby. DANIELLE does not have insurance and is interested in applying for Medicaid for herself and baby. [facility] notified to follow up with DANIELLE. DANIELLE denied needing [facility] services at this time. Care Management Interventions  PCP Verified by CM: Yes([facility])  Mode of Transport at Discharge:  Other (see comment)  Transition of Care Consult (CM Consult): Discharge Planning  Current Support Network: Own Home, Family Lives Nearby, Lives with Spouse  Confirm Follow Up Transport: Family  Discharge Location  Discharge Placement: Home with family assistance  Diana Montenegro

## 2020-10-27 VITALS
DIASTOLIC BLOOD PRESSURE: 70 MMHG | TEMPERATURE: 98 F | HEIGHT: 63 IN | RESPIRATION RATE: 16 BRPM | WEIGHT: 183 LBS | SYSTOLIC BLOOD PRESSURE: 115 MMHG | OXYGEN SATURATION: 92 % | BODY MASS INDEX: 32.43 KG/M2 | HEART RATE: 80 BPM

## 2020-10-27 RX ORDER — FERROUS SULFATE 325(65) MG
325 TABLET, DELAYED RELEASE (ENTERIC COATED) ORAL 2 TIMES DAILY WITH MEALS
Qty: 60 TAB | Refills: 2 | Status: SHIPPED | OUTPATIENT
Start: 2020-10-27 | End: 2021-09-01

## 2020-10-27 RX ORDER — IBUPROFEN 800 MG/1
800 TABLET ORAL
Qty: 30 TAB | Refills: 0 | Status: SHIPPED | OUTPATIENT
Start: 2020-10-27 | End: 2021-09-01

## 2020-10-27 RX ORDER — DOCUSATE SODIUM 100 MG/1
100 CAPSULE, LIQUID FILLED ORAL
Qty: 30 CAP | Refills: 0 | Status: SHIPPED | OUTPATIENT
Start: 2020-10-27

## 2020-10-27 NOTE — LACTATION NOTE
This note was copied from a baby's chart. Mom comfortable with breast feeding. States baby nursed well during the night. Mom to call with net feeding. Discussed breast feeding  Discharge information with parents. Breast Feeding Discharge Information discussed:    Chart shows numerous feedings, void, stool WNL. Discussed Importance of monitoring outputs and feedings on first week of  Breastfeeding. Discussed ways to tell if baby getting enough, ie  Voids and stools, by day 7, baby should have at least  4-6 wet diapers a day, change in color of stool to a seedy yellow, and return to birth wt within 2 weeks with a steady increase after that. .  Follow up with pediatrician visit for weight check in 1-2 days reviewed. Discussed Breast feeding support groups and encouraged to call Warm line number, [phone]  for any breast feeding questions or problems that arise. Please leave a message and let us know what is going on. We will return your call within 24 hours. Breast feeding Support groups meet at [facility] the first and third Wednesday of the month from 11-12 noon. Meetings are held in a classroom past the cafeteria on the first floor. Feedings  Encouraged mom to attempt feeding with baby led feeding cues. Just as sucking on fingers, rooting, mouthing. Looking for 8-12 feedings in 24 hours. Don't limit baby at breast, allow baby to come off breast on it's own. Baby may want to feed  often and may increase number of feedings on second day of life. Skin to skin encouraged. In 4-6 weeks, baby may go though a growth spurt and increase feedings for several days to increase your milk supply. If baby doesn't nurse,  Mom should Pump or hand express drops, 12-18 drops, and give infant any expressed milk. If not pumping any milk, mom should contact pediatrician for possible need for supplementation. MOM's DIET    Discussed eating a healthy diet.  Instructed mother to eat a variety of foods in order to get a well balanced diet. She should consume an extra 300-500 calories per day (more than her non-pregnant requirement.) These extra calories will help provide energy needed for optimal breast milk production. Mother also encouraged to \"drink to thirst\" and it is recommended that she drink fluids such as water and fruit/vegetable juice. Nutritious snacks should be available so that she can eat throughout the day to help satisfy her hunger and maintain a good milk supply. Continue taking your Prenatal vitamins as long as you breast feed. Engorgement Care Guidelines:  Anticipatory guidance shared. If breast become engorged, to help decrease engorgement. Frequent breastfeeding encouraged, cool packs around breast after nursing may help. May take motrin or Ibuprofen as ordered by your Doctor.       Call your doctor, midwife and/or lactation consultant if:   Marlon Lagos is having no wet or dirty diapers    Baby has dark colored urine after day 3  (should be pale yellow to clear)    Baby has dark colored stools after day 4  (should be mustard yellow, with no meconium)    Baby has fewer wet/soiled diapers or nurses less   frequently than the goals listed here    Mom has symptoms of mastitis   (sore breast with fever, chills, flu-like aching)

## 2020-10-27 NOTE — ROUTINE PROCESS
Bedside and Verbal shift change report given to PITO Biggs RN (oncoming nurse) by Power Blackmon. Adele Finley RN (offgoing nurse). Report included the following information SBAR, Kardex, Intake/Output, MAR and Recent Results.

## 2020-10-27 NOTE — PROGRESS NOTES
1300  Patient discharged to home. Discharge instructions and education completed and patient reported she had no more questions. Bands verified on patient and infant, see footprint sheet. Infant placed in car seat by parent.      Prescriptions:   Colace  Motrin  Iron

## 2020-10-27 NOTE — DISCHARGE INSTRUCTIONS
Patient Discharge Instructions    Keyla Martinez / [id number] : 1990    Admitted 10/26/2020 Discharged: 10/27/2020       Por favor tenga lawanda documento presente en deleon ligia de seguimiento con deleon médico primario. Primary care provider:  Cuco Renner MD    Discharging provider:  Gia Kenyon MD  - Family Medicine Resident  Nel Quintana MD -  OBGYN attending        [facility]:  Pregnant and not yet delivered in third trimester [Z34.93]    RECOMENDACIONES DE CUIDADO:     Follow-up Information     Follow up With Specialties Details Why [address] [facility] office will call you to schedule a postpartum follow up visit in approximately 6 weeks [address] [facility] [address]. [address]  [phone]    Esther Kumar MD Family Medicine On 10/28/2020 8:00 AM - [place] appointment and weight check [address]  [phone]            Continue Fermín Paz:  - Por favor continue Motrin 800 mg, 1 tableta cada 8 horas por los próximos 2 días, luego 1 tableta cada 8 horas mientras sea necesario para el dolor.  - Por favor continue Colace 100 mg para el estreñimiento, tome 1 tableta dos veces al día hasta que pueda defercar regularmente sin necesidad del medicamento. - Por favor comience Ferrous Sulfate 325 mg para la anemia, [place] 1 tableta [address] día con jugo de [employer]. - Por favor continue tomando jayme vitaminas prenatales. Pruebas de seguimiento que necesita: ninguno    Resultados pendientes:   En el momento de deleon de danni los resultados de las siguientes pruebas están pendiente:  ninguno. Por favor discuta estos resultados con deleon proveedor primario en deleon ligia de seguimiento. Importante que [employer] siguientes síntomas: dolor de [place], falta de [place], [place], escalofríos, náusea, vómito, diarrea, cambios en deleon estado mental, caídas, debilidad y sangrado.       DIETA/que comer: Regular    ACTIVIDAD FÍSICA:   Instrucciones de [facility] Física    No levante nada que sea más pesado que deleon bebé por las próximas 6 semanas. No insertar nada por la vaginal por 6 semanas. Luego del parto por cesárea/ vaginal debe evitar tener relaciones sexuales por 6 semanas. Tendrá deleon ligia de seguimiento posparto a las 6 semanas. Puede manejar deleon vehículo mientras no esté tomando Percocet ni ningún medicamento nárcotico (Motrin está neris). Cuidado de Herida:  llene el jimmie bottle con agua tibia y exprima hasta que salga un chorro de agua por la boquilla para ayudarle a limpiar el área perineal.      Entiendo que si surge algún problema cuando llegue a mi hogar puedo contactar a mi médico.    Me rivera explicado las siguientes instrucciones y rivera aclarado mis dudas. Entiendo y reconozco las instrucciones brindadas. Firma de Sam Madura / Robins Yovany de paciente ó representante                                                         Fecha/Hora    Patient Education        El período posparto: Instrucciones de cuidado-Instrucciones de cuidado  Postpartum: Care Instructions  Instrucciones de cuidado  Después del parto (período posparto), deleon cuerpo pasa por muchos cambios. Algunos de estos cambios suceden pennie varias semanas. [address], el cuerpo comienza a recuperarse y se prepara para el amamantamiento. Es posible que [address] se sienta sensible. Las hormonas pueden cambiar deleon estado de ánimo sin advertencia y sin motivo aparente.   Pennie las primeras semanas después del parto, muchas mujeres tienen emociones que [place] de luna a rene. Es posible que le resulte difícil dormir. Es posible que llore mucho. Hector se llama \"melancolía de la maternidad\". Estas emociones abrumadoras suelen desaparecer dentro de unos días o semanas. Robbie es importante hablar con deleon médico acerca de jayme sentimientos. Pennie las primeras semanas después del Bonita, es fácil cansarse demasiado o sentirse [place]. No larissa demasiados esfuerzos. Descanse cada vez que pueda, acepte la ayuda de los demás, coma neris y heidy abundantes líquidos. En el primer par de [employer] de lizzy a toya, es posible que deleon médico o partera deseen verla y hacer un plan para cualquier atención de seguimiento que usted pueda necesitar. Probablemente tendrá Bryson Eth ligia completa de posparto dentro de los primeros 3 meses después del Bonita. En kurt momento, deleon médico o partera revisarán deleon recuperación del parto. Él o christiano también verán cómo está enfrentando usted jayme emociones y conversarán sobre jayme inquietudes y preguntas. La atención de seguimiento es ish parte clave de deleon tratamiento y seguridad. Asegúrese de hacer y acudir a todas las citas, y llame a deleon médico si está teniendo problemas. También es ish buena idea saber los resultados de jayme exámenes y mantener ish lista de los medicamentos que toni. ¿Cómo puede cuidarse en el hogar? · Duerma o descanse cuando deleon bebé lo larissa. · Si es posible, permita que jayme familiares o jayme amigos la ayuden con las tareas del hogar. No intente hacerlo todo usted dionicio. · Si tiene hemorroides o hinchazón o dolor alrededor de la abertura de la vagina, pruebe aplicarse frío y calor. Puede aplicarse hielo o ish compresa fría en la anupama pennie 10 a 20 minutos cada vez. Póngase un paño gayle entre el hielo y la piel. Además, trate de sentarse en algunos centímetros de agua tibia (baño de asiento) 3 veces al día y después de las evacuaciones.   · [employer] analgésicos (medicamentos para el dolor) exactamente según las indicaciones. ? Si el médico le recetó un analgésico, tómelo según las indicaciones. ? Si no está tomando un analgésico recetado, pregúntele a deleon médico si puede osmin sánchez de [employer]. · Coma más fibra para evitar el estreñimiento. Incluya alimentos luis panes y cereales integrales, verduras crudas, frutas crudas y secas, y frijoles (habichuelas). · Radha abundantes líquidos, los suficientes luis para que deleon orina sea de color amarillo mac o transparente luis el agua. Si tiene [employer], del corazón o del hígado y tiene que [place]'s líquidos, hable con deleon médico antes de aumentar deleon consumo. · No se lave el interior de la vagina con líquidos (lavados vaginales). · Si tiene puntos de sutura, mantenga la anupama limpia con agua tibia, ya sea echándola o rociándola sobre la anupama externa de la vagina y el ano después de usar el baño. · Zee ish lista de preguntas para hacerle a deleon médico o partera. Liat preguntas podrían ser sobre lo siguiente:  ? [employer] senos, luis bultos o sensibilidad. ? Cuándo esperar que regrese deleon período menstrual.  ? Qué forma de anticoncepción es la más adecuada para usted. ? El peso que aumentó pennie el [facility]. ? Las opciones de [address]. ? Cathern Grater y bebidas son mejores para usted, sobre todo si está amamantando. ? Problemas que podría tener con la lactancia. ? Cuándo puede [employer]. Algunas mujeres delbert vez quieran hablar sobre lubricantes vaginales. ? Cualquier sentimiento de tristeza o inquietud que tenga. ¿Cuándo debe pedir ayuda? Llame al 911 en cualquier momento que considere que necesita atención de [place]. Por ejemplo, llame si:    · Tiene pensamientos de lastimarse o de hacerle daño a deleon bebé o a otra persona.     · Se desmayó (perdió el conocimiento).     · Tiene dolor en el pecho, le falta el aire o tose you.     · Tiene convulsiones.    Llame a deleon médico ahora mismo o busque atención médica de inmediato si:    · Deleon sangrado vaginal parece hacerse más abundante.     · Se siente mareada o aturdida, o que está a punto de desmayarse.     · Tiene fiebre.     · Tiene dolor abdominal nuevo o más intenso.     · Tiene síntomas de un coágulo de you en la pierna (que se llama trombosis venosa profunda), luis:  ? Dolor en la pantorrilla, el muslo, la alfa o detrás de la rodilla. ? Enrojecimiento e hinchazón en la pierna o la alfa.     · Tiene señales de preeclampsia, luis:  ? Hinchazón repentina de la chava, las evelyn o los pies. ? Nuevos problemas de la vista (luis oscurecimiento, clyde borroso o clyde puntos). ? Dolor de ehsan intenso. Preste especial atención a los cambios en deleon ekta y asegúrese de comunicarse con deleon médico si:    · Tiene nuevo flujo vaginal o lawanda empeora.     · Se siente rene o deprimida.     · Tiene problemas con los senos o el amamantamiento. ¿Dónde puede encontrar más información en inglés? Vaya a http://www.gray.com/  Elayne [id number] en la búsqueda para aprender más acerca de \"El período posparto: Instrucciones de cuidado-Instrucciones de cuidado. \"  Revisado: 11 febrero, 2020               Versión del contenido: 12.6  © 1255-5814 Healthwise, Incorporated. Las instrucciones de cuidado fueron adaptadas bajo licencia por Good Help Connections (which disclaims liability or warranty for this information). Si usted tiene [place] afección médica o sobre estas instrucciones, siempre pregunte a deleon profesional de ekta. Healthwise, Incorporated niega toda garantía o responsabilidad por deleon uso de esta información. Patient Education        Lactancia: Joshua Noon  Breastfeeding: Care Instructions  Instrucciones de cuidado     Amamantar tiene muchos beneficios. Puede disminuir las probabilidades de que deleon bebé contraiga ish infección. También puede hacer que sea menos probable que deleon bebé tenga problemas luis diabetes y obesidad en un futuro. Amamantar también la ayuda a establecer carlito afectivos con deleon bebé. Pennie los primeros días después del parto, liat senos producen un líquido espeso y amarillento llamado calostro. [place] al bebé nutrientes y anticuerpos contra las infecciones. Eso es todo lo que los bebés necesitan pennie los primeros días después del nacimiento. Liat senos se llenarán de [address] unos kit después del Ray. Amamantar es ish habilidad que mejora con la práctica. Sea paciente consigo misma y con deleon bebé. Si tiene problemas, puede obtener [place] y seguir amamantando. La atención de seguimiento es ish parte clave de deleon tratamiento y seguridad. Asegúrese de hacer y acudir a todas las citas, y llame a deleon médico si está teniendo problemas. También es ish buena idea saber los resultados de liat exámenes y mantener ish lista de los medicamentos que toni. ¿Cómo puede cuidarse en el hogar? · Amamante a deleon bebé toda vez que tenga hambre. Las primeras 2 semanas, deleon bebé tomará el pecho al menos 8 veces en un período de 24 horas. [place] le permitirá mantener deleon Lorena Orts. Las señales de que deleon bebé tiene hambre incluyen:  ? Succionarse las [place]. ? Lamerse los labios. ? Girar la ehsan hacia deleon pecho. · Ponga ish almohada o ish almohada de lactancia en deleon regazo para apoyar los brazos y a deleon bebé. · Sostenga a deleon bebé en ish posición cómoda. ? Puede sostener a deleon bebé de diversas formas. Ish de las posiciones más comunes es la de [place]. Con un brazo sostiene a deleon bebé, con la ehsan del bebé apoyada en la curva del codo. Con la mano abierta le sostiene el trasero o la espalda a deleon bebé. El abdomen de deleon bebé reposa contra el suyo. ? Si tuvo a deleon bebé por cesárea, trate de sostenerlo en la posición de fútbol americano. Esta posición mantiene a deleon bebé alejado de deleon estómago. Ponga a deleon bebé debajo del brazo, con el cuerpo del lado que lo Tommye Pencil a amamantar. Sostenga la parte superior del cuerpo de deleon bebé con el Roxianne Hair.  Con albina mano usted puede controlar la ehsan de deleon bebé para atraerle la boca a deleon seno. ? Pruebe diferentes posiciones con cada sesión de alimentación. Si está teniendo [place], pídale ayuda a deleon médico o a un consultor de lactancia. · Para lograr que deleon bebé se prenda al pezón:  ? Heather Delay y apriétese el seno con ish mano en forma de \"U\", con el pulgar del lado externo del seno y los dedos del lado interno. También puede sostenerse el seno con la mano en forma de \"C\", con todos los dedos debajo del pezón y el pulgar arriba. Pruebe las diferentes posiciones para conseguir la prendida más profunda para cualquier posición de [employer] use. Deleon otro brazo está detrás de la espalda de deleon bebé, con la mano sosteniendo la base de la ehsan del bebé. Coloque los dedos y el pulgar apuntando a las orejas del bebé. ? Puede tocar el labio inferior del bebé con deleon pezón para hacer que deleon bebé ash deleon boca. Espere hasta que deleon bebé ash neris la boca, luis un bostezo denilson. Luego, asegúrese de atraer a deleon bebé rápidamente a deleon seno, en vez de deleon seno al bebé. A medida que acerca a deleon bebé al seno, use la otra mano para sostener el seno y guiarlo dentro de la boca del bebé. ? Tanto el pezón luis ish gran parte de la anupama más oscura alrededor del pezón (areola) deben estar en la boca del bebé. Los labios del bebé deben estar abiertos hacia afuera, no doblados hacia adentro (invertidos). ? Verifique que haya un patrón regular al succionar y tragar mientras el bebé se está alimentando. Si no puede clyde ni escuchar ish deglución diana, observe las orejas del bebé, que se moverán levemente cuando el bebé traga. Si la nariz de deleon bebé parece estar bloqueada por deleon seno, lleve más a deleon bebé contra deleon cuerpo. Orovada ayudará a inclinar la ehsan del bebé ligeramente hacia atrás, de modo que solo el borde de ish fosa nasal esté johnathon para respirar.   ? Cuando deleon bebé está prendido, generalmente puede sacar deleon mano de abajo de deleon seno y colocarla debajo de deleon bebé para acunarlo. Ahora relájese y amamante a deleon bebé. · Usted sabrá que deleon bebé se está alimentando neris cuando:  ? Deleon boca cubre ish buena parte de la areola y los labios están curvados hacia afuera. ? Urvashi Patel y deleon nariz descansan sobre deleon pecho. ? La succión es profunda y rítmica, con pausas cortas. ? Puede clyde y oír cómo traga deleon bebé. ? No siente dolor en el pezón. · Sandra Kayser senos a deleon bebé en cada sesión de alimentación. Cada vez que [place], cambie el seno con el que comienza. · Cada vez que necesite retirar al bebé de deleon seno, póngale un dedo en la comisura de los labios. Empuje el dedo suavemente entre las encías de deleon bebé para romper el sello. Si no rompe el sello hermético antes de retirar a deleon bebé, jayme pezones pueden ponerse doloridos, agrietados o amoratados. · Después de alimentar a deleon bebé, deidra unas palmaditas suaves en la espalda para que pueda eliminar el aire que haya tragado. Después de que el bebé eructe, vuelva a ofrecerle el mismo seno o el otro. Algunas veces el bebé querrá seguir comiendo después de eructar. ¿Cuándo debe pedir ayuda? Llame a deleon médico ahora mismo o busque atención médica inmediata si:    · Tiene síntomas de ish infección en el seno, tales luis:  ? Mayor dolor, hinchazón, enrojecimiento o temperatura alrededor del seno. ? Vetas rojizas que se extienden del seno. ? Pus que supura del seno. ? Domingo Huguenin.     · Deleon bebé no ha mojado pañales pennie 6 horas. Preste especial atención a los cambios en deleon ekta y asegúrese de comunicarse con deleon médico si:    · Deleon bebé tiene dificultades para prenderse al seno.     · Usted continúa sintiendo dolor o incomodidad al amamantar.     · Tiene otras preguntas o inquietudes. ¿Dónde puede encontrar más información en inglés?   Vaya a http://www.brown.com/  Donnie Pont P492 en la búsqueda para aprender más acerca de \"Lactancia: Instrucciones de cuidado. \"  Revisado: 11 febrero, 2020               Versión del contenido: 12.6  © 6024-3767 Healthwise, Incorporated. Las instrucciones de cuidado fueron adaptadas bajo licencia por Good Help Connections (which disclaims liability or warranty for this information). Si usted tiene [place] afección médica o sobre estas instrucciones, siempre pregunte a deleon profesional de ekta. [email or web address] niega toda garantía o responsabilidad por deleon uso de esta información.

## 2020-11-03 ENCOUNTER — ROUTINE PRENATAL (OUTPATIENT)
Dept: FAMILY MEDICINE CLINIC | Age: 30
End: 2020-11-03

## 2020-11-03 VITALS
WEIGHT: 161 LBS | HEART RATE: 86 BPM | TEMPERATURE: 97.2 F | BODY MASS INDEX: 28.53 KG/M2 | SYSTOLIC BLOOD PRESSURE: 103 MMHG | RESPIRATION RATE: 16 BRPM | HEIGHT: 63 IN | DIASTOLIC BLOOD PRESSURE: 70 MMHG | OXYGEN SATURATION: 97 %

## 2020-11-03 PROBLEM — Z34.93 PREGNANT AND NOT YET DELIVERED IN THIRD TRIMESTER: Status: RESOLVED | Noted: 2020-10-26 | Resolved: 2020-11-03

## 2020-11-03 PROCEDURE — 0503F POSTPARTUM CARE VISIT: CPT | Performed by: STUDENT IN AN ORGANIZED HEALTH CARE EDUCATION/TRAINING PROGRAM

## 2020-11-03 NOTE — PROGRESS NOTES
1. Have you been to the ER, urgent care clinic since your last visit? Hospitalized since your last visit? No    2. Have you seen or consulted any other health care providers outside of the [address] [place] [address] since your last visit? Include any pap smears or colon screening. No  Reviewed record in preparation for visit and have necessary documentation  Pt did not bring medication to office visit for review    Goals that were addressed and/or need to be completed during or after this appointment include   There are no preventive care reminders to display for this patient.

## 2020-11-03 NOTE — PROGRESS NOTES
Postpartum Note    27 y.o. female status post  presenting for postpartum visit. Patient doing well post-partum without significant complaint. Lochia: normal  Pain: controlled  Baby: doing well, has seen PCP  Sexual activity:  Plan for contraception: condoms  Symptoms of depression: none, [place] screen low risk  Breast/bottle: Breast and bottle  Support from FOB/family:  Good support    Vitals: There were no vitals taken for this visit. Exam:  Patient without distress. Abdomen soft, fundus firm at level of umbilicus, nontender. Lower extremities:  No edema. No palpable cords or tenderness. Assessment and Plan:    Micah Villa is a 27 y.o. [id number] s/p  at 39 weeks 6 days. Patient having uncomplicated post-partum course. 1. Continue routine care  2. Call clinic or make appointment for symptoms of sadness  3.  Follow up for yearly well woman exam.                   Ino Burrell DO

## 2020-11-03 NOTE — PROGRESS NOTES
I saw and evaluated the patient, performing the key elements of the service. I discussed the findings, assessment and plan with the resident and agree with the resident's findings and plan as documented in the resident's note. - - -

## 2020-11-03 NOTE — PATIENT INSTRUCTIONS
Postpartum: Care Instructions Your Care Instructions After childbirth (postpartum period), your body goes through many changes. Some of these changes happen over several weeks. In the hours after delivery, your body will begin to recover from childbirth while it prepares to breastfeed your . You may feel emotional during this time. Your hormones can shift your mood without warning for no clear reason. In the first couple of weeks after childbirth, many women have emotions that change from happy to sad. You may find it hard to sleep. You may cry a lot. This is called the \"baby blues. \" These overwhelming emotions often go away within a couple of days or weeks. But it's important to discuss your feelings with your doctor. It is easy to get too tired and overwhelmed during the first weeks after childbirth. Don't try to do too much. Get rest whenever you can, accept help from others, and eat well and drink plenty of fluids. In the first couple of weeks after giving birth, your doctor or midwife may want to check in with you and make a plan for any follow-up care you may need. You will likely have a complete postpartum visit in the first 3 months after delivery. At that time, your doctor or midwife will check on your recovery from childbirth. He or she will also see how you are doing with your emotions and talk about your concerns or questions. Follow-up care is a key part of your treatment and safety. Be sure to make and go to all appointments, and call your doctor if you are having problems. It's also a good idea to know your test results and keep a list of the medicines you take. How can you care for yourself at home? · Sleep or rest when your baby sleeps. · Get help with household chores from family or friends, if you can. Do not try to do it all yourself.  
· If you have hemorrhoids or swelling or pain around the opening of your vagina, try using cold and heat. You can put ice or a cold pack on the area for 10 to 20 minutes at a time. Put a thin cloth between the ice and your skin. Also try sitting in a few inches of warm water (sitz bath) 3 times a day and after bowel movements. · Take pain medicines exactly as directed. ? If the doctor gave you a prescription medicine for pain, take it as prescribed. ? If you are not taking a prescription pain medicine, ask your doctor if you can take an over-the-counter medicine. · Eat more fiber to avoid constipation. Include foods such as whole-grain breads and cereals, raw vegetables, raw and dried fruits, and beans. · Drink plenty of fluids, enough so that your urine is light yellow or clear like water. If you have kidney, heart, or liver disease and have to limit fluids, talk with your doctor before you increase the amount of fluids you drink. · Do not rinse inside your vagina with fluids (douche). · If you have stitches, keep the area clean by pouring or spraying warm water over the area outside your vagina and anus after you use the toilet. · Keep a list of questions to ask your doctor or midwife. Your questions might be about: 
? Changes in your breasts, such as lumps or soreness. ? When to expect your menstrual period to start again. ? What form of birth control is best for you. ? Weight you have put on during the pregnancy. ? Exercise options. ? What foods and drinks are best for you, especially if you are breastfeeding. ? Problems you might be having with breastfeeding. ? When you can have sex. Some women may want to talk about lubricants for the vagina. ? Any feelings of sadness or restlessness that you are having. When should you call for help? Call 911 anytime you think you may need emergency care. For example, call if: 
  · You have thoughts of harming yourself, your baby, or another person.  
  · You passed out (lost consciousness).   · You have chest pain, are short of breath, or cough up blood.  
  · You have a seizure. Call your doctor now or seek immediate medical care if: 
  · Your vaginal bleeding seems to be getting heavier.  
  · You are dizzy or lightheaded, or you feel like you may faint.  
  · You have a fever.  
  · You have new or more belly pain.  
  · You have symptoms of a blood clot in your leg (called a deep vein thrombosis), such as: 
? Pain in the calf, back of the knee, thigh, or groin. ? Redness and swelling in your leg or groin.  
  · You have signs of preeclampsia, such as: 
? Sudden swelling of your face, hands, or feet. ? New vision problems (such as dimness, blurring, or seeing spots). ? A severe headache. Watch closely for changes in your health, and be sure to contact your doctor if: 
  · You have new or worse vaginal discharge.  
  · You feel sad or depressed.  
  · You are having problems with your breasts or breastfeeding. Where can you learn more? Go to http://www.gray.com/ Enter R590 in the search box to learn more about \"Postpartum: Care Instructions. \" Current as of: February 11, 2020               Content Version: 12.6 © 2150-1792 [employer]. Care instructions adapted under license by [employer] (which disclaims liability or warranty for this information). If you have questions about a medical condition or this instruction, always ask your healthcare professional. [place] [address] any warranty or liability for your use of this information.

## 2021-06-10 ENCOUNTER — OFFICE VISIT (OUTPATIENT)
Dept: FAMILY MEDICINE CLINIC | Age: 31
End: 2021-06-10

## 2021-06-10 VITALS
BODY MASS INDEX: 27.11 KG/M2 | WEIGHT: 153 LBS | TEMPERATURE: 98.5 F | HEART RATE: 89 BPM | HEIGHT: 63 IN | OXYGEN SATURATION: 99 % | SYSTOLIC BLOOD PRESSURE: 97 MMHG | DIASTOLIC BLOOD PRESSURE: 62 MMHG | RESPIRATION RATE: 18 BRPM

## 2021-06-10 DIAGNOSIS — R35.0 URINARY FREQUENCY: Primary | ICD-10-CM

## 2021-06-10 DIAGNOSIS — R30.0 DYSURIA: ICD-10-CM

## 2021-06-10 LAB
ALBUMIN UR QL STRIP: 150 MG/L
BILIRUB UR QL STRIP: ABNORMAL
CREATININE, URINE POC: 100 MG/DL
GLUCOSE UR-MCNC: NEGATIVE MG/DL
KETONES P FAST UR STRIP-MCNC: NEGATIVE MG/DL
MICROALBUMIN/CREAT RATIO POC: >300 MG/G
PH UR STRIP: 8.5 [PH] (ref 4.6–8)
PROT UR QL STRIP: ABNORMAL
SP GR UR STRIP: 1.02 (ref 1–1.03)
UA UROBILINOGEN AMB POC: ABNORMAL (ref 0.2–1)
URINALYSIS CLARITY POC: ABNORMAL
URINALYSIS COLOR POC: ABNORMAL
URINE BLOOD POC: ABNORMAL
URINE LEUKOCYTES POC: ABNORMAL
URINE NITRITES POC: NEGATIVE

## 2021-06-10 PROCEDURE — 99213 OFFICE O/P EST LOW 20 MIN: CPT | Performed by: FAMILY MEDICINE

## 2021-06-10 PROCEDURE — 82044 UR ALBUMIN SEMIQUANTITATIVE: CPT | Performed by: FAMILY MEDICINE

## 2021-06-10 PROCEDURE — 81001 URINALYSIS AUTO W/SCOPE: CPT | Performed by: FAMILY MEDICINE

## 2021-06-10 RX ORDER — CEPHALEXIN 500 MG/1
500 CAPSULE ORAL 2 TIMES DAILY
Qty: 10 CAPSULE | Refills: 0 | Status: SHIPPED | OUTPATIENT
Start: 2021-06-10 | End: 2021-06-15

## 2021-06-10 RX ORDER — GLUCOSAMINE/CHONDR SU A SOD 750-600 MG
1 TABLET ORAL DAILY
COMMUNITY

## 2021-06-10 NOTE — PROGRESS NOTES
1. Have you been to the ER, urgent care clinic since your last visit? Hospitalized since your last visit? No    2. Have you seen or consulted any other health care providers outside of the [address] [place] [address] since your last visit? Include any pap smears or colon screening. No    Reviewed record in preparation for visit and have necessary documentation  Goals that were addressed and/or need to be completed during or after this appointment include     Health Maintenance Due   Topic Date Due    COVID-19 Vaccine (1) Never done       Patient is accompanied by self I have received verbal consent from Sonia Oakes to discuss any/all medical information while they are present in the room.

## 2021-06-11 LAB
BACTERIA SPEC CULT: ABNORMAL
CC UR VC: ABNORMAL
SERVICE CMNT-IMP: ABNORMAL

## 2021-08-25 ENCOUNTER — TELEPHONE (OUTPATIENT)
Dept: FAMILY MEDICINE CLINIC | Age: 31
End: 2021-08-25

## 2021-08-25 ENCOUNTER — OFFICE VISIT (OUTPATIENT)
Dept: FAMILY MEDICINE CLINIC | Age: 31
End: 2021-08-25

## 2021-08-25 VITALS
TEMPERATURE: 98.4 F | SYSTOLIC BLOOD PRESSURE: 104 MMHG | DIASTOLIC BLOOD PRESSURE: 70 MMHG | OXYGEN SATURATION: 99 % | HEART RATE: 80 BPM | RESPIRATION RATE: 20 BRPM

## 2021-08-25 DIAGNOSIS — S61.412A LACERATION OF LEFT HAND WITHOUT FOREIGN BODY, INITIAL ENCOUNTER: Primary | ICD-10-CM

## 2021-08-25 PROCEDURE — 99214 OFFICE O/P EST MOD 30 MIN: CPT | Performed by: FAMILY MEDICINE

## 2021-08-25 RX ORDER — DOXYCYCLINE 100 MG/1
100 CAPSULE ORAL 2 TIMES DAILY
Qty: 14 CAPSULE | Refills: 0 | Status: SHIPPED | OUTPATIENT
Start: 2021-08-25 | End: 2021-09-01 | Stop reason: SDUPTHER

## 2021-08-25 NOTE — TELEPHONE ENCOUNTER
Patient called per Dr. Talib Tucker:  I just called it in    Patient verbalized understanding and appreciative.

## 2021-08-25 NOTE — TELEPHONE ENCOUNTER
Dr. Nimo Reed would like pt to follow up in a week to check on hand. No availability until 9/14. Is this okay? Or does provider want her to be seen sooner?

## 2021-08-25 NOTE — PROGRESS NOTES
1. Have you been to the ER, urgent care clinic since your last visit? Hospitalized since your last visit? No    2. Have you seen or consulted any other health care providers outside of the [address] [place] [address] since your last visit? Include any pap smears or colon screening.  No  Reviewed record in preparation for visit and have necessary documentation  Pt did not bring medication to office visit for review    Goals that were addressed and/or need to be completed during or after this appointment include   Health Maintenance Due   Topic Date Due    COVID-19 Vaccine (1) Never done

## 2021-08-25 NOTE — PROGRESS NOTES
[facility]    History of Present Illness:   Murtaza Frazier is a 27 y.o. female with history of anemia, depression/panic atacks  CC: Laceration  History provided by patient and Records    HPI:  Laceration:  Patient prevents with Laceration to the hand - left that occurred several minute(s) ago. Mechanism of injury: knife, clean. Patient reports no coldness, no numbness, no paresthesias. Pain at this time described as sharp of moderate severity. Patient is not on blood thinners. Patient last TDaP: Last 1 year. Health Maintenance  Health Maintenance Due   Topic Date Due    COVID-19 Vaccine (1) Never done       Past Medical, Family, and Social History:     Current Outpatient Medications on File Prior to Visit   Medication Sig Dispense Refill    Biotin 2,500 mcg cap Take 1 Capsule by mouth daily.  docusate sodium (COLACE) 100 mg capsule Take 1 Cap by mouth two (2) times daily as needed for Constipation. 30 Cap 0    ibuprofen (MOTRIN) 800 mg tablet Take 1 Tab by mouth every eight (8) hours as needed for Pain. (Patient not taking: Reported on 6/10/2021) 30 Tab 0    ferrous sulfate (IRON) 325 mg (65 mg iron) EC tablet Take 1 Tab by mouth two (2) times daily (with meals). (Patient not taking: Reported on 6/10/2021) 60 Tab 2    riboflavin, vitamin B2, (Vitamin B-2) 100 mg tablet Take 100 mg by mouth two (2) times a day. (Patient not taking: Reported on 6/10/2021)      pyridoxine, vitamin B6, (VITAMIN B-6) 25 mg tablet Take 1 Tab by mouth daily. (Patient not taking: Reported on 6/10/2021) 90 Tab 1    prenatal RCKZ09/HFQK fum/folic (prenatal vitamin) 27 mg iron- 800 mcg tab tablet Take 1 Tab by mouth daily. (Patient not taking: Reported on 6/10/2021) 90 Tab 3     No current facility-administered medications on file prior to visit.        Patient Active Problem List   Diagnosis Code    Anemia D64.9    Depression F32.9    Panic attack F41.0       Social History Socioeconomic History    Marital status:      Spouse name: Not on file    Number of children: Not on file    Years of education: Not on file    Highest education level: Not on file   Occupational History    Not on file   Tobacco Use    Smoking status: Never Smoker    Smokeless tobacco: Never Used   Substance and Sexual Activity    Alcohol use: No    Drug use: No    Sexual activity: Yes     Partners: Male     Birth control/protection: None   Other Topics Concern     Service Not Asked    Blood Transfusions Not Asked    Caffeine Concern Not Asked    Occupational Exposure Not Asked    Hobby Hazards Not Asked    Sleep Concern Not Asked    Stress Concern Not Asked    Weight Concern Not Asked    Special Diet Not Asked    Back Care Not Asked    Exercise Not Asked    Bike Helmet Not Asked   [address] Not Asked    Self-Exams Not Asked   Social History Narrative    Not on file     Social Determinants of Health     Financial Resource Strain:     Difficulty of Paying Living Expenses:    Food Insecurity:     Worried About Running Out of Food in the Last Year:     Ran Out of Food in the Last Year:    Transportation Needs:     Lack of Transportation (Medical):  Lack of Transportation (Non-Medical):    Physical Activity:     Days of Exercise per Week:     Minutes of Exercise per Session:    Stress:     Feeling of Stress :    Social Connections:     Frequency of Communication with Friends and Family:     Frequency of Social Gatherings with Friends and Family:     Attends Adventism Services:     Active Member of Clubs or Organizations:     Attends Club or Organization Meetings:     Marital Status:    Intimate Partner Violence:     Fear of Current or Ex-Partner:     Emotionally Abused:     Physically Abused:     Sexually Abused:        Review of Systems   Review of Systems   Constitutional: Negative for chills and fever.    Gastrointestinal: Negative for nausea and vomiting. Endo/Heme/Allergies: Does not bruise/bleed easily. Objective:     Visit Vitals  /70   Pulse 80   Temp 98.4 °F (36.9 °C)   Resp 20   SpO2 99%        Physical Exam  Vitals and nursing note reviewed. Constitutional:       Appearance: Normal appearance. HENT:      Head: Normocephalic and atraumatic. Cardiovascular:      Rate and Rhythm: Normal rate and regular rhythm. Pulses: Normal pulses. Heart sounds: Normal heart sounds. Pulmonary:      Effort: Pulmonary effort is normal.      Breath sounds: Normal breath sounds. Musculoskeletal:      Cervical back: Normal range of motion and neck supple. Skin:     General: Skin is warm and dry. Comments: Laceration on the ulnar side of the left hand. Neurological:      Mental Status: She is alert. Pertinent Labs/Studies:      Assessment and orders:       ICD-10-CM ICD-9-CM    1. Laceration of left hand without foreign body, initial encounter  S61.412A 882.0 doxycycline (MONODOX) 100 mg capsule     Diagnoses and all orders for this visit:    1. Laceration of left hand without foreign body, initial encounter  -     doxycycline (MONODOX) 100 mg capsule; Take 1 Capsule by mouth two (2) times a day for 7 days. Indications: inflammation of the gums and mouth      Follow-up and Dispositions    · Return in about 1 week (around 9/1/2021). I have discussed the diagnosis with the patient and the intended plan as seen in the above orders. Social history, medical history, and labs were reviewed. The patient has received an after-visit summary and questions were answered concerning future plans. I have discussed medication side effects and warnings with the patient as well.     MD NATHALIE Sullivan & DEYANIRA ELLIS [facility]  08/25/21

## 2021-08-25 NOTE — PROGRESS NOTES
Clinic Procedure Note:    Procedure performed: Wound repair of laceration of the . Indications: Wound Repair/Healing/Hemostasis    Date of procedure: 08/25/21    Chart reviewed for the following:              Yareli REDDY MD, have reviewed the History, Physical and updated the Allergic reactions for Ysitie 30 performed immediately prior to start of procedure:              Yareli REDDY MD, have performed the following reviews on [place] prior to the start of the procedure, see attached form in media. Immediately prior to the procedure, the patient was reevaluated and found suitable for the planned procedure and any planned medications. Procedure:    Location details: right thumb  Wound length: linear of 2.5-3 cm. Anesthesia: local infiltration  Local anesthetic: lidocaine 1% without epinephrine  Anesthetic total: 5 ml    Foreign bodies: no foreign bodies  Irrigation solution: saline  Irrigation method: syringe  Debridement: none    Skin closure: #4 Nylon  Number of sutures: 5   Technique: simple  Approximation: loose  Dressing: antibiotic ointment  Patient tolerance: Patient tolerated the procedure well with no immediate complications. My total time at bedside, performing this procedure was 1-15 minutes. Procedure performed by:   Yareli Pederson MD  Provider assisted by: Jeniffer Escobar LPN   Patient assisted by: self     How tolerated by patient: tolerated the procedure well with no complications    Comments: none    Yareli Pederson MD  Resident NATHALIE LOMAX & DEYANIRA ELLIS [facility] [place]

## 2021-09-01 ENCOUNTER — OFFICE VISIT (OUTPATIENT)
Dept: FAMILY MEDICINE CLINIC | Age: 31
End: 2021-09-01

## 2021-09-01 VITALS
HEART RATE: 79 BPM | SYSTOLIC BLOOD PRESSURE: 104 MMHG | WEIGHT: 152.6 LBS | DIASTOLIC BLOOD PRESSURE: 70 MMHG | BODY MASS INDEX: 27.04 KG/M2 | OXYGEN SATURATION: 100 % | TEMPERATURE: 98.2 F | HEIGHT: 63 IN | RESPIRATION RATE: 18 BRPM

## 2021-09-01 DIAGNOSIS — S61.412D LACERATION OF LEFT HAND WITHOUT FOREIGN BODY, SUBSEQUENT ENCOUNTER: Primary | ICD-10-CM

## 2021-09-01 PROCEDURE — 99214 OFFICE O/P EST MOD 30 MIN: CPT | Performed by: FAMILY MEDICINE

## 2021-09-01 RX ORDER — MELOXICAM 7.5 MG/1
7.5 TABLET ORAL DAILY
Qty: 30 TABLET | Refills: 1 | Status: SHIPPED | OUTPATIENT
Start: 2021-09-01

## 2021-09-01 RX ORDER — DOXYCYCLINE 100 MG/1
100 CAPSULE ORAL 2 TIMES DAILY
Qty: 14 CAPSULE | Refills: 0 | Status: SHIPPED | OUTPATIENT
Start: 2021-09-01 | End: 2021-09-08

## 2021-09-01 NOTE — PROGRESS NOTES
Chief Complaint   Patient presents with    Hand Laceration     follow up     Visit Vitals  /70 (BP 1 Location: Right arm)   Pulse 79   Temp 98.2 °F (36.8 °C) (Temporal)   Resp 18   Ht 5' 3\" (1.6 m)   Wt 152 lb 9.6 oz (69.2 kg)   SpO2 100%   BMI 27.03 kg/m²     1. Have you been to the ER, urgent care clinic since your last visit? Hospitalized since your last visit? No    2. Have you seen or consulted any other health care providers outside of the [address] [place] [address] since your last visit? Include any pap smears or colon screening.  No    Reviewed record in preparation for visit and have necessary documentation  Pt did not bring medication to office visit for review  opportunity was given for questions  Goals that were addressed and/or need to be completed during or after this appointment include   Health Maintenance Due   Topic Date Due    COVID-19 Vaccine (1) Never done    Flu Vaccine (1) 09/01/2021

## 2021-09-01 NOTE — PROGRESS NOTES
[facility]    History of Present Illness:   Lolita Gordon is a 27 y.o. female with history of Anemia, Depression, Panic Attack  CC: Follow up Laceration. History provided by patient and Records    HPI:  Follow up Laceration Hand. Healing well, no blood or drainage. Swelling improved. Some tenderness at edge of cut still. Completed Doxycycline. Noting had to do work due to stitches. Works at [employer]. Health Maintenance  Health Maintenance Due   Topic Date Due    COVID-19 Vaccine (1) Never done    Flu Vaccine (1) 09/01/2021       Past Medical, Family, and Social History:     Current Outpatient Medications on File Prior to Visit   Medication Sig Dispense Refill    Biotin 2,500 mcg cap Take 1 Capsule by mouth daily.  docusate sodium (COLACE) 100 mg capsule Take 1 Cap by mouth two (2) times daily as needed for Constipation. 30 Cap 0    [DISCONTINUED] doxycycline (MONODOX) 100 mg capsule Take 1 Capsule by mouth two (2) times a day for 7 days. Indications: inflammation of the gums and mouth (Patient not taking: Reported on 9/1/2021) 14 Capsule 0    [DISCONTINUED] ibuprofen (MOTRIN) 800 mg tablet Take 1 Tab by mouth every eight (8) hours as needed for Pain. (Patient not taking: Reported on 6/10/2021) 30 Tab 0    [DISCONTINUED] ferrous sulfate (IRON) 325 mg (65 mg iron) EC tablet Take 1 Tab by mouth two (2) times daily (with meals). (Patient not taking: Reported on 6/10/2021) 60 Tab 2    [DISCONTINUED] riboflavin, vitamin B2, (Vitamin B-2) 100 mg tablet Take 100 mg by mouth two (2) times a day. (Patient not taking: Reported on 6/10/2021)      [DISCONTINUED] pyridoxine, vitamin B6, (VITAMIN B-6) 25 mg tablet Take 1 Tab by mouth daily. (Patient not taking: Reported on 6/10/2021) 90 Tab 1    [DISCONTINUED] prenatal PSGG15/SCWL fum/folic (prenatal vitamin) 27 mg iron- 800 mcg tab tablet Take 1 Tab by mouth daily.  (Patient not taking: Reported on 6/10/2021) 90 Tab 3     No current facility-administered medications on file prior to visit. Patient Active Problem List   Diagnosis Code    Anemia D64.9    Depression F32.9    Panic attack F41.0       Social History     Socioeconomic History    Marital status:      Spouse name: Not on file    Number of children: Not on file    Years of education: Not on file    Highest education level: Not on file   Occupational History    Not on file   Tobacco Use    Smoking status: Never Smoker    Smokeless tobacco: Never Used   Substance and Sexual Activity    Alcohol use: No    Drug use: No    Sexual activity: Yes     Partners: Male     Birth control/protection: None   Other Topics Concern     Service Not Asked    Blood Transfusions Not Asked    Caffeine Concern Not Asked    Occupational Exposure Not Asked    Hobby Hazards Not Asked    Sleep Concern Not Asked    Stress Concern Not Asked    Weight Concern Not Asked    Special Diet Not Asked    Back Care Not Asked    Exercise Not Asked    Bike Helmet Not Asked   [address] Not Asked    Self-Exams Not Asked   Social History Narrative    Not on file     Social Determinants of Health     Financial Resource Strain:     Difficulty of Paying Living Expenses:    Food Insecurity:     Worried About Running Out of Food in the Last Year:     Ran Out of Food in the Last Year:    Transportation Needs:     Lack of Transportation (Medical):      Lack of Transportation (Non-Medical):    Physical Activity:     Days of Exercise per Week:     Minutes of Exercise per Session:    Stress:     Feeling of Stress :    Social Connections:     Frequency of Communication with Friends and Family:     Frequency of Social Gatherings with Friends and Family:     Attends Yarsanism Services:     Active Member of Clubs or Organizations:     Attends Club or Organization Meetings:     Marital Status:    Intimate Partner Violence:     Fear of Current or Ex-Partner:     Emotionally Abused:     Physically Abused:     Sexually Abused:        Review of Systems   Review of Systems   Constitutional: Negative for chills and fever. HENT: Negative for congestion. Cardiovascular: Negative for chest pain and palpitations. Gastrointestinal: Negative for abdominal pain, nausea and vomiting. Objective:     Visit Vitals  /70 (BP 1 Location: Right arm)   Pulse 79   Temp 98.2 °F (36.8 °C) (Temporal)   Resp 18   Ht 5' 3\" (1.6 m)   Wt 152 lb 9.6 oz (69.2 kg)   SpO2 100%   BMI 27.03 kg/m²        Physical Exam  Vitals and nursing note reviewed. Constitutional:       Appearance: Normal appearance. HENT:      Head: Normocephalic and atraumatic. Cardiovascular:      Rate and Rhythm: Normal rate and regular rhythm. Pulses: Normal pulses. Heart sounds: Normal heart sounds. No murmur heard. No friction rub. No gallop. Pulmonary:      Effort: Pulmonary effort is normal.      Breath sounds: Normal breath sounds. Abdominal:      General: Abdomen is flat. Bowel sounds are normal.      Palpations: Abdomen is soft. Musculoskeletal:      Cervical back: Normal range of motion and neck supple. Skin:     General: Skin is warm and dry. Comments: Well healing laceration with 5 stiches. Neurological:      General: No focal deficit present. Mental Status: She is alert and oriented to person, place, and time. Pertinent Labs/Studies:      Assessment and orders:       ICD-10-CM ICD-9-CM    1. Laceration of left hand without foreign body, subsequent encounter  S61.412D V58.89 SUTURE / STAPLE REMOVAL BY PROVIDER      doxycycline (MONODOX) 100 mg capsule      meloxicam (MOBIC) 7.5 mg tablet     Diagnoses and all orders for this visit:    1. Laceration of left hand without foreign body, subsequent encounter: Removed Sutures from hand  -     SUTURE / STAPLE REMOVAL BY PROVIDER  -     doxycycline (MONODOX) 100 mg capsule;  Take 1 Capsule by mouth two (2) times a day for 7 days. Indications: inflammation of the gums and mouth  -     meloxicam (MOBIC) 7.5 mg tablet; Take 1 Tablet by mouth daily. Follow-up and Dispositions    · Return if symptoms worsen or fail to improve. I have discussed the diagnosis with the patient and the intended plan as seen in the above orders. Social history, medical history, and labs were reviewed. The patient has received an after-visit summary and questions were answered concerning future plans. I have discussed medication side effects and warnings with the patient as well.     MD NATHALIE Davis & DEYANIRA ELLIS [facility]  09/01/21

## 2022-06-30 ENCOUNTER — TELEPHONE (OUTPATIENT)
Dept: FAMILY MEDICINE CLINIC | Age: 32
End: 2022-06-30

## 2022-06-30 NOTE — TELEPHONE ENCOUNTER
Called to collect data for a study on breastfeeding we are conducting with our patients we followed during their pregnancies. Questions answered.

## 2023-09-28 ENCOUNTER — NURSE TRIAGE (OUTPATIENT)
Dept: OTHER | Facility: CLINIC | Age: 33
End: 2023-09-28

## 2023-09-28 NOTE — TELEPHONE ENCOUNTER
[facility] [place]    Received call from Lisa at [facility] with [facility]. Subjective: Caller states \"The last 7-8 months Lower left back pain that radiates to her abdomen. Also having numbness in left hand. Was seen for the numbness in left hand - 2 years ago, it is unchanged. \"     Current Symptoms: LL back pain. Onset: 7 months ago; gradual    Associated Symptoms: NA    Pain scale 6/10 sharp comes and goes and ebbs and flows    What has been tried: Biofreeze - does not help    LMP:  currently on  Pregnant: No    Recommended disposition: Go to ED/UCC Now (Or to Office with PCP Approval)    Care advice provided, patient verbalizes understanding; denies any other questions or concerns; instructed to call back for any new or worsening symptoms. Patient/Caller agrees with recommended disposition; writer provided warm transfer to [facility] at [facility] for appointment scheduling - Patient advised to go to [address] if unable to get an appointment now at [facility]. Attention Provider: Thank you for allowing me to participate in the care of your patient. The patient was connected to triage in response to information provided to the ECC/PSC. Please do not respond through this encounter as the response is not directed to a shared pool.         Reason for Disposition   Vomiting and pain over lower ribs of back (i.e., flank - kidney area)    Protocols used: Back Pain-ADULT-[place]

## 2023-10-03 ENCOUNTER — OFFICE VISIT (OUTPATIENT)
Facility: CLINIC | Age: 33
End: 2023-10-03

## 2023-10-03 VITALS
HEART RATE: 77 BPM | TEMPERATURE: 98.3 F | DIASTOLIC BLOOD PRESSURE: 71 MMHG | RESPIRATION RATE: 17 BRPM | SYSTOLIC BLOOD PRESSURE: 104 MMHG | BODY MASS INDEX: 27.64 KG/M2 | OXYGEN SATURATION: 99 % | HEIGHT: 63 IN | WEIGHT: 156 LBS

## 2023-10-03 DIAGNOSIS — S39.012A STRAIN OF LUMBAR PARASPINOUS MUSCLE, INITIAL ENCOUNTER: Primary | ICD-10-CM

## 2023-10-03 PROCEDURE — 99213 OFFICE O/P EST LOW 20 MIN: CPT | Performed by: STUDENT IN AN ORGANIZED HEALTH CARE EDUCATION/TRAINING PROGRAM

## 2023-10-03 RX ORDER — IBUPROFEN 600 MG/1
600 TABLET ORAL 3 TIMES DAILY PRN
Qty: 30 TABLET | Refills: 0 | Status: SHIPPED | OUTPATIENT
Start: 2023-10-03

## 2023-10-03 RX ORDER — CYCLOBENZAPRINE HCL 5 MG
5 TABLET ORAL 2 TIMES DAILY PRN
Qty: 10 TABLET | Refills: 0 | Status: SHIPPED | OUTPATIENT
Start: 2023-10-03 | End: 2023-10-13

## 2023-10-03 RX ORDER — LIDOCAINE 4 G/G
1 PATCH TOPICAL DAILY
Qty: 30 PATCH | Refills: 0 | Status: SHIPPED | OUTPATIENT
Start: 2023-10-03 | End: 2023-11-02

## 2023-10-03 SDOH — ECONOMIC STABILITY: INCOME INSECURITY: HOW HARD IS IT FOR YOU TO PAY FOR THE VERY BASICS LIKE FOOD, HOUSING, MEDICAL CARE, AND HEATING?: NOT HARD AT ALL

## 2023-10-03 SDOH — ECONOMIC STABILITY: HOUSING INSECURITY
IN THE LAST 12 MONTHS, WAS THERE A TIME WHEN YOU DID NOT HAVE A STEADY PLACE TO SLEEP OR SLEPT IN A SHELTER (INCLUDING NOW)?: NO

## 2023-10-03 SDOH — ECONOMIC STABILITY: FOOD INSECURITY: WITHIN THE PAST 12 MONTHS, THE FOOD YOU BOUGHT JUST DIDN'T LAST AND YOU DIDN'T HAVE MONEY TO GET MORE.: NEVER TRUE

## 2023-10-03 SDOH — ECONOMIC STABILITY: FOOD INSECURITY: WITHIN THE PAST 12 MONTHS, YOU WORRIED THAT YOUR FOOD WOULD RUN OUT BEFORE YOU GOT MONEY TO BUY MORE.: NEVER TRUE

## 2023-10-03 ASSESSMENT — PATIENT HEALTH QUESTIONNAIRE - PHQ9
3. TROUBLE FALLING OR STAYING ASLEEP: 0
2. FEELING DOWN, DEPRESSED OR HOPELESS: 0
5. POOR APPETITE OR OVEREATING: 0
6. FEELING BAD ABOUT YOURSELF - OR THAT YOU ARE A FAILURE OR HAVE LET YOURSELF OR YOUR FAMILY DOWN: 0
1. LITTLE INTEREST OR PLEASURE IN DOING THINGS: 0
4. FEELING TIRED OR HAVING LITTLE ENERGY: 0
SUM OF ALL RESPONSES TO PHQ QUESTIONS 1-9: 0
9. THOUGHTS THAT YOU WOULD BE BETTER OFF DEAD, OR OF HURTING YOURSELF: 0
7. TROUBLE CONCENTRATING ON THINGS, SUCH AS READING THE NEWSPAPER OR WATCHING TELEVISION: 0
SUM OF ALL RESPONSES TO PHQ9 QUESTIONS 1 & 2: 0
SUM OF ALL RESPONSES TO PHQ QUESTIONS 1-9: 0
8. MOVING OR SPEAKING SO SLOWLY THAT OTHER PEOPLE COULD HAVE NOTICED. OR THE OPPOSITE, BEING SO FIGETY OR RESTLESS THAT YOU HAVE BEEN MOVING AROUND A LOT MORE THAN USUAL: 0